# Patient Record
Sex: FEMALE | Race: WHITE | Employment: OTHER
[De-identification: names, ages, dates, MRNs, and addresses within clinical notes are randomized per-mention and may not be internally consistent; named-entity substitution may affect disease eponyms.]

---

## 2016-12-30 LAB
BASOPHILS ABSOLUTE: NORMAL /ΜL
BASOPHILS RELATIVE PERCENT: NORMAL %
EOSINOPHILS ABSOLUTE: NORMAL /ΜL
EOSINOPHILS RELATIVE PERCENT: NORMAL %
HCT VFR BLD CALC: 40.1 % (ref 36–46)
HEMOGLOBIN: 13 G/DL (ref 12–16)
LYMPHOCYTES ABSOLUTE: NORMAL /ΜL
LYMPHOCYTES RELATIVE PERCENT: NORMAL %
MCH RBC QN AUTO: 30.7 PG
MCHC RBC AUTO-ENTMCNC: 32.5 G/DL
MCV RBC AUTO: 94 FL
MONOCYTES ABSOLUTE: NORMAL /ΜL
MONOCYTES RELATIVE PERCENT: NORMAL %
NEUTROPHILS ABSOLUTE: NORMAL /ΜL
NEUTROPHILS RELATIVE PERCENT: NORMAL %
PLATELET # BLD: 197 K/ΜL
PMV BLD AUTO: 10 FL
RBC # BLD: 4.25 10^6/ΜL
WBC # BLD: 11 10^3/ML

## 2017-01-03 DIAGNOSIS — K64.8 OTHER HEMORRHOIDS: ICD-10-CM

## 2017-01-04 ENCOUNTER — TELEPHONE (OUTPATIENT)
Dept: FAMILY MEDICINE CLINIC | Facility: CLINIC | Age: 72
End: 2017-01-04

## 2017-01-04 DIAGNOSIS — E78.2 MIXED HYPERLIPIDEMIA: Primary | ICD-10-CM

## 2017-01-04 DIAGNOSIS — R73.01 ELEVATED FASTING GLUCOSE: ICD-10-CM

## 2017-01-24 LAB
ALBUMIN SERPL-MCNC: 3.9 G/DL
ALP BLD-CCNC: 50 U/L
ALT SERPL-CCNC: 18 U/L
AST SERPL-CCNC: 19 U/L
BILIRUB SERPL-MCNC: 0.9 MG/DL (ref 0.1–1.4)
BUN BLDV-MCNC: 19 MG/DL
CALCIUM SERPL-MCNC: 9.3 MG/DL
CHLORIDE BLD-SCNC: 102 MMOL/L
CHOLESTEROL, TOTAL: 151 MG/DL
CHOLESTEROL/HDL RATIO: 2.7
CO2: 31 MMOL/L
CREAT SERPL-MCNC: 0.54 MG/DL
GFR CALCULATED: >60
GLUCOSE BLD-MCNC: 114 MG/DL
HBA1C MFR BLD: 6.3 %
HDLC SERPL-MCNC: 55 MG/DL (ref 35–70)
LDL CHOLESTEROL CALCULATED: 57 MG/DL (ref 0–160)
POTASSIUM SERPL-SCNC: 4.4 MMOL/L
SODIUM BLD-SCNC: 139 MMOL/L
TOTAL PROTEIN: 7.6
TRIGL SERPL-MCNC: 196 MG/DL
VLDLC SERPL CALC-MCNC: 39 MG/DL

## 2017-01-25 DIAGNOSIS — R73.01 IMPAIRED FASTING GLUCOSE: Primary | ICD-10-CM

## 2017-01-25 DIAGNOSIS — R73.01 ELEVATED FASTING GLUCOSE: ICD-10-CM

## 2017-01-25 DIAGNOSIS — E78.2 MIXED HYPERLIPIDEMIA: ICD-10-CM

## 2017-04-19 ENCOUNTER — OFFICE VISIT (OUTPATIENT)
Dept: FAMILY MEDICINE CLINIC | Age: 72
End: 2017-04-19
Payer: MEDICARE

## 2017-04-19 VITALS
SYSTOLIC BLOOD PRESSURE: 136 MMHG | BODY MASS INDEX: 33.68 KG/M2 | TEMPERATURE: 98.4 F | HEART RATE: 74 BPM | RESPIRATION RATE: 15 BRPM | HEIGHT: 63 IN | DIASTOLIC BLOOD PRESSURE: 86 MMHG | WEIGHT: 190.1 LBS

## 2017-04-19 DIAGNOSIS — I42.9 CARDIOMYOPATHY (HCC): ICD-10-CM

## 2017-04-19 DIAGNOSIS — R73.01 ELEVATED FASTING GLUCOSE: ICD-10-CM

## 2017-04-19 DIAGNOSIS — G47.09 OTHER INSOMNIA: ICD-10-CM

## 2017-04-19 DIAGNOSIS — E78.00 HYPERCHOLESTEROLEMIA: ICD-10-CM

## 2017-04-19 DIAGNOSIS — Z23 NEED FOR PNEUMOCOCCAL VACCINATION: ICD-10-CM

## 2017-04-19 DIAGNOSIS — R73.03 PRE-DIABETES: ICD-10-CM

## 2017-04-19 DIAGNOSIS — I10 BENIGN HYPERTENSION: Primary | ICD-10-CM

## 2017-04-19 DIAGNOSIS — F41.9 ANXIETY: ICD-10-CM

## 2017-04-19 PROCEDURE — 1123F ACP DISCUSS/DSCN MKR DOCD: CPT | Performed by: NURSE PRACTITIONER

## 2017-04-19 PROCEDURE — 4040F PNEUMOC VAC/ADMIN/RCVD: CPT | Performed by: NURSE PRACTITIONER

## 2017-04-19 PROCEDURE — 1090F PRES/ABSN URINE INCON ASSESS: CPT | Performed by: NURSE PRACTITIONER

## 2017-04-19 PROCEDURE — 1036F TOBACCO NON-USER: CPT | Performed by: NURSE PRACTITIONER

## 2017-04-19 PROCEDURE — 99214 OFFICE O/P EST MOD 30 MIN: CPT | Performed by: NURSE PRACTITIONER

## 2017-04-19 PROCEDURE — G8417 CALC BMI ABV UP PARAM F/U: HCPCS | Performed by: NURSE PRACTITIONER

## 2017-04-19 PROCEDURE — G8427 DOCREV CUR MEDS BY ELIG CLIN: HCPCS | Performed by: NURSE PRACTITIONER

## 2017-04-19 PROCEDURE — G8598 ASA/ANTIPLAT THER USED: HCPCS | Performed by: NURSE PRACTITIONER

## 2017-04-19 PROCEDURE — 3014F SCREEN MAMMO DOC REV: CPT | Performed by: NURSE PRACTITIONER

## 2017-04-19 PROCEDURE — 3017F COLORECTAL CA SCREEN DOC REV: CPT | Performed by: NURSE PRACTITIONER

## 2017-04-19 PROCEDURE — G8400 PT W/DXA NO RESULTS DOC: HCPCS | Performed by: NURSE PRACTITIONER

## 2017-04-19 RX ORDER — TRAZODONE HYDROCHLORIDE 100 MG/1
100 TABLET ORAL NIGHTLY PRN
Qty: 30 TABLET | Refills: 5 | Status: SHIPPED | OUTPATIENT
Start: 2017-04-19 | End: 2018-06-11 | Stop reason: SDUPTHER

## 2017-04-19 ASSESSMENT — ENCOUNTER SYMPTOMS
CHOKING: 0
PHOTOPHOBIA: 0
NAUSEA: 0
SHORTNESS OF BREATH: 0
CHEST TIGHTNESS: 0
COUGH: 0
COLOR CHANGE: 0
TROUBLE SWALLOWING: 0
VOICE CHANGE: 0
BLURRED VISION: 0
ABDOMINAL PAIN: 0
EYE DISCHARGE: 0
EYE ITCHING: 0
CONSTIPATION: 0
DIARRHEA: 0
EYE PAIN: 0
EYE REDNESS: 0
BLOOD IN STOOL: 0
VOMITING: 0

## 2017-05-08 ENCOUNTER — OFFICE VISIT (OUTPATIENT)
Dept: GYNECOLOGIC ONCOLOGY | Age: 72
End: 2017-05-08
Payer: MEDICARE

## 2017-05-08 VITALS
HEIGHT: 63 IN | DIASTOLIC BLOOD PRESSURE: 84 MMHG | BODY MASS INDEX: 34.02 KG/M2 | WEIGHT: 192 LBS | TEMPERATURE: 97.8 F | HEART RATE: 77 BPM | SYSTOLIC BLOOD PRESSURE: 135 MMHG | RESPIRATION RATE: 18 BRPM | OXYGEN SATURATION: 99 %

## 2017-05-08 DIAGNOSIS — D07.1 SEVERE VULVAR DYSPLASIA, HISTOLOGICALLY CONFIRMED: Primary | ICD-10-CM

## 2017-05-08 PROCEDURE — 3014F SCREEN MAMMO DOC REV: CPT | Performed by: OBSTETRICS & GYNECOLOGY

## 2017-05-08 PROCEDURE — 4040F PNEUMOC VAC/ADMIN/RCVD: CPT | Performed by: OBSTETRICS & GYNECOLOGY

## 2017-05-08 PROCEDURE — 1036F TOBACCO NON-USER: CPT | Performed by: OBSTETRICS & GYNECOLOGY

## 2017-05-08 PROCEDURE — G8417 CALC BMI ABV UP PARAM F/U: HCPCS | Performed by: OBSTETRICS & GYNECOLOGY

## 2017-05-08 PROCEDURE — G8598 ASA/ANTIPLAT THER USED: HCPCS | Performed by: OBSTETRICS & GYNECOLOGY

## 2017-05-08 PROCEDURE — G8427 DOCREV CUR MEDS BY ELIG CLIN: HCPCS | Performed by: OBSTETRICS & GYNECOLOGY

## 2017-05-08 PROCEDURE — 1090F PRES/ABSN URINE INCON ASSESS: CPT | Performed by: OBSTETRICS & GYNECOLOGY

## 2017-05-08 PROCEDURE — G8400 PT W/DXA NO RESULTS DOC: HCPCS | Performed by: OBSTETRICS & GYNECOLOGY

## 2017-05-08 PROCEDURE — 3017F COLORECTAL CA SCREEN DOC REV: CPT | Performed by: OBSTETRICS & GYNECOLOGY

## 2017-05-08 PROCEDURE — 1123F ACP DISCUSS/DSCN MKR DOCD: CPT | Performed by: OBSTETRICS & GYNECOLOGY

## 2017-05-08 PROCEDURE — 99213 OFFICE O/P EST LOW 20 MIN: CPT | Performed by: OBSTETRICS & GYNECOLOGY

## 2017-05-08 ASSESSMENT — ENCOUNTER SYMPTOMS
ALLERGIC/IMMUNOLOGIC NEGATIVE: 1
EYES NEGATIVE: 1
RESPIRATORY NEGATIVE: 1
ABDOMINAL DISTENTION: 0
ABDOMINAL PAIN: 0
BLOOD IN STOOL: 0
SHORTNESS OF BREATH: 0
GASTROINTESTINAL NEGATIVE: 1

## 2017-06-26 ENCOUNTER — TELEPHONE (OUTPATIENT)
Dept: FAMILY MEDICINE CLINIC | Age: 72
End: 2017-06-26

## 2017-07-06 LAB
ALBUMIN SERPL-MCNC: 3.8 G/DL
ALP BLD-CCNC: 40 U/L
ALT SERPL-CCNC: 24 U/L
AST SERPL-CCNC: 21 U/L
BILIRUB SERPL-MCNC: 0.7 MG/DL (ref 0.1–1.4)
BUN BLDV-MCNC: 15 MG/DL
CALCIUM SERPL-MCNC: 9.4 MG/DL
CHLORIDE BLD-SCNC: 103 MMOL/L
CHOLESTEROL, TOTAL: 138 MG/DL
CHOLESTEROL/HDL RATIO: 2.6
CO2: 30 MMOL/L
CREAT SERPL-MCNC: 0.7 MG/DL
GFR CALCULATED: >60
GLUCOSE BLD-MCNC: 111 MG/DL
HBA1C MFR BLD: 6.1 %
HDLC SERPL-MCNC: 53 MG/DL (ref 35–70)
LDL CHOLESTEROL CALCULATED: 52 MG/DL (ref 0–160)
POTASSIUM SERPL-SCNC: 4.6 MMOL/L
SODIUM BLD-SCNC: 141 MMOL/L
TOTAL PROTEIN: 0.7
TRIGL SERPL-MCNC: 163 MG/DL
VLDLC SERPL CALC-MCNC: 33 MG/DL

## 2017-07-07 DIAGNOSIS — R73.01 ELEVATED FASTING GLUCOSE: ICD-10-CM

## 2017-07-07 DIAGNOSIS — I10 BENIGN HYPERTENSION: ICD-10-CM

## 2017-07-07 DIAGNOSIS — E78.00 HYPERCHOLESTEROLEMIA: ICD-10-CM

## 2017-07-07 DIAGNOSIS — R73.03 PRE-DIABETES: ICD-10-CM

## 2017-09-11 DIAGNOSIS — R73.03 PRE-DIABETES: ICD-10-CM

## 2017-10-20 ENCOUNTER — OFFICE VISIT (OUTPATIENT)
Dept: FAMILY MEDICINE CLINIC | Age: 72
End: 2017-10-20
Payer: MEDICARE

## 2017-10-20 VITALS
HEIGHT: 63 IN | DIASTOLIC BLOOD PRESSURE: 84 MMHG | TEMPERATURE: 97.8 F | HEART RATE: 71 BPM | OXYGEN SATURATION: 98 % | WEIGHT: 194.3 LBS | RESPIRATION RATE: 16 BRPM | BODY MASS INDEX: 34.43 KG/M2 | SYSTOLIC BLOOD PRESSURE: 132 MMHG

## 2017-10-20 DIAGNOSIS — R73.03 PRE-DIABETES: ICD-10-CM

## 2017-10-20 DIAGNOSIS — I10 BENIGN HYPERTENSION: ICD-10-CM

## 2017-10-20 DIAGNOSIS — I10 ESSENTIAL HYPERTENSION: ICD-10-CM

## 2017-10-20 DIAGNOSIS — Z12.11 SCREENING FOR COLON CANCER: ICD-10-CM

## 2017-10-20 DIAGNOSIS — F32.9 MAJOR DEPRESSIVE DISORDER WITH SINGLE EPISODE, REMISSION STATUS UNSPECIFIED: ICD-10-CM

## 2017-10-20 DIAGNOSIS — E78.5 DYSLIPIDEMIA: Primary | ICD-10-CM

## 2017-10-20 PROCEDURE — 1090F PRES/ABSN URINE INCON ASSESS: CPT | Performed by: NURSE PRACTITIONER

## 2017-10-20 PROCEDURE — 1036F TOBACCO NON-USER: CPT | Performed by: NURSE PRACTITIONER

## 2017-10-20 PROCEDURE — 4040F PNEUMOC VAC/ADMIN/RCVD: CPT | Performed by: NURSE PRACTITIONER

## 2017-10-20 PROCEDURE — 1123F ACP DISCUSS/DSCN MKR DOCD: CPT | Performed by: NURSE PRACTITIONER

## 2017-10-20 PROCEDURE — G8417 CALC BMI ABV UP PARAM F/U: HCPCS | Performed by: NURSE PRACTITIONER

## 2017-10-20 PROCEDURE — 3017F COLORECTAL CA SCREEN DOC REV: CPT | Performed by: NURSE PRACTITIONER

## 2017-10-20 PROCEDURE — G8400 PT W/DXA NO RESULTS DOC: HCPCS | Performed by: NURSE PRACTITIONER

## 2017-10-20 PROCEDURE — 3014F SCREEN MAMMO DOC REV: CPT | Performed by: NURSE PRACTITIONER

## 2017-10-20 PROCEDURE — G8427 DOCREV CUR MEDS BY ELIG CLIN: HCPCS | Performed by: NURSE PRACTITIONER

## 2017-10-20 PROCEDURE — G8484 FLU IMMUNIZE NO ADMIN: HCPCS | Performed by: NURSE PRACTITIONER

## 2017-10-20 PROCEDURE — G8598 ASA/ANTIPLAT THER USED: HCPCS | Performed by: NURSE PRACTITIONER

## 2017-10-20 PROCEDURE — 99213 OFFICE O/P EST LOW 20 MIN: CPT | Performed by: NURSE PRACTITIONER

## 2017-10-20 RX ORDER — LEVOTHYROXINE SODIUM 0.05 MG/1
50 TABLET ORAL DAILY
COMMUNITY
End: 2021-01-01 | Stop reason: SDUPTHER

## 2017-10-20 ASSESSMENT — PATIENT HEALTH QUESTIONNAIRE - PHQ9
2. FEELING DOWN, DEPRESSED OR HOPELESS: 0
SUM OF ALL RESPONSES TO PHQ QUESTIONS 1-9: 0
SUM OF ALL RESPONSES TO PHQ9 QUESTIONS 1 & 2: 0
1. LITTLE INTEREST OR PLEASURE IN DOING THINGS: 0

## 2017-10-20 NOTE — PROGRESS NOTES
myalgias or shortness of breath. Current antihyperlipidemic treatment includes statins. Risk factors for coronary artery disease include hypertension, post-menopausal and obesity. Review of Systems   Constitutional: Negative for activity change, appetite change, chills, diaphoresis, fatigue and fever. HENT: Negative for ear discharge, ear pain, trouble swallowing and voice change. Eyes: Negative for photophobia, pain, discharge, redness, itching and visual disturbance. Respiratory: Negative for cough, choking, chest tightness and shortness of breath. Cardiovascular: Negative for chest pain, palpitations and leg swelling. Gastrointestinal: Negative for abdominal pain, blood in stool, constipation, diarrhea, nausea and vomiting. Endocrine: Negative. Genitourinary: Negative for difficulty urinating, dysuria, flank pain, frequency and hematuria. Musculoskeletal: Negative for arthralgias, gait problem, joint swelling, myalgias and neck pain. Skin: Negative for color change and rash. Allergic/Immunologic: Positive for environmental allergies. Negative for immunocompromised state. Neurological: Negative for dizziness, syncope, light-headedness and headaches. Hematological: Negative for adenopathy. Does not bruise/bleed easily. Psychiatric/Behavioral: Positive for sleep disturbance (chronic. trazodone helps. ). Negative for decreased concentration, dysphoric mood and suicidal ideas. The patient is nervous/anxious (stopped lexapro. Mood ok. ). Objective:   Physical Exam   Constitutional: She is oriented to person, place, and time. She appears well-developed and well-nourished. No distress. HENT:   Head: Normocephalic and atraumatic. Right Ear: External ear normal.   Left Ear: External ear normal.   Nose: Rhinorrhea (clear.) present. Mouth/Throat: Oropharynx is clear and moist and mucous membranes are normal. No oropharyngeal exudate.    Eyes: Conjunctivae are normal. Pupils are

## 2017-10-20 NOTE — PATIENT INSTRUCTIONS
Trial of allergy pill. Benadryl, Claritin, zyrtec,  Fasting blood work in 2-3 months. Monitor for worsening symptoms. Call office with concerns. Patient Education        Managing Your Allergies: Care Instructions  Your Care Instructions  Managing your allergies is an important part of staying healthy. Your doctor will help you find out what may be causing the allergies. Common causes of allergy symptoms are house dust and dust mites, animal dander, mold, and pollen. As soon as you know what triggers your symptoms, try to reduce your exposure to your triggers. This can help prevent allergy symptoms, asthma, and other health problems. Ask your doctor about allergy medicine or immunotherapy. These treatments may help reduce or prevent allergy symptoms. Follow-up care is a key part of your treatment and safety. Be sure to make and go to all appointments, and call your doctor if you are having problems. It's also a good idea to know your test results and keep a list of the medicines you take. How can you care for yourself at home? · If you think that dust or dust mites are causing your allergies:  ¨ Wash sheets, pillowcases, and other bedding every week in hot water. ¨ Use airtight, dust-proof covers for pillows, duvets, and mattresses. Avoid plastic covers, because they tend to tear quickly and do not \"breathe. \" Wash according to the instructions. ¨ Remove extra blankets and pillows that you don't need. ¨ Use blankets that are machine-washable. ¨ Don't use home humidifiers. They can help mites live longer. · Use air-conditioning. Change or clean all filters every month. Keep windows closed. Use high-efficiency air filters. Don't use window or attic fans, which draw dust into the air. · If you're allergic to pet dander, keep pets outside or, at the very least, out of your bedroom. Old carpet and cloth-covered furniture can hold a lot of animal dander. You may need to replace them.   · Look for signs of cockroaches. Use cockroach baits to get rid of them. Then clean your home well. · If you're allergic to mold, don't keep indoor plants, because molds can grow in soil. Get rid of furniture, rugs, and drapes that smell musty. Check for mold in the bathroom. · If you're allergic to pollen, stay inside when pollen counts are high. · Don't smoke or let anyone else smoke in your house. Don't use fireplaces or wood-burning stoves. Avoid paint fumes, perfumes, and other strong odors. When should you call for help? Give an epinephrine shot if:  · You think you are having a severe allergic reaction. After giving an epinephrine shot call 911, even if you feel better. Call 911 if:  · You have symptoms of a severe allergic reaction. These may include:  ¨ Sudden raised, red areas (hives) all over your body. ¨ Swelling of the throat, mouth, lips, or tongue. ¨ Trouble breathing. ¨ Passing out (losing consciousness). Or you may feel very lightheaded or suddenly feel weak, confused, or restless. · You have been given an epinephrine shot, even if you feel better. Call your doctor now or seek immediate medical care if:  · You have symptoms of an allergic reaction, such as:  ¨ A rash or hives (raised, red areas on the skin). ¨ Itching. ¨ Swelling. ¨ Belly pain, nausea, or vomiting. Watch closely for changes in your health, and be sure to contact your doctor if:  · Your allergies get worse. · You need help controlling your allergies. · You have questions about allergy testing. · You do not get better as expected. Where can you learn more? Go to [email or web address] and sign in to your [employer] account. Enter L249 in the [employer] box to learn more about \"Managing Your Allergies: Care Instructions. \"     If you do not have an account, please click on the \"Sign Up Now\" link. Current as of: April 3, 2017  Content Version: 11.3  © [id number] [employer], [facility].  Care

## 2017-10-22 ASSESSMENT — ENCOUNTER SYMPTOMS
EYE ITCHING: 0
DIARRHEA: 0
COLOR CHANGE: 0
VOICE CHANGE: 0
SHORTNESS OF BREATH: 0
CHEST TIGHTNESS: 0
ABDOMINAL PAIN: 0
EYE DISCHARGE: 0
BLOOD IN STOOL: 0
EYE REDNESS: 0
CHOKING: 0
NAUSEA: 0
VOMITING: 0
PHOTOPHOBIA: 0
EYE PAIN: 0
TROUBLE SWALLOWING: 0
COUGH: 0
CONSTIPATION: 0

## 2017-11-01 DIAGNOSIS — Z12.11 SCREENING FOR COLON CANCER: ICD-10-CM

## 2017-11-01 LAB
CONTROL: PRESENT
HEMOCCULT STL QL: POSITIVE

## 2017-11-01 PROCEDURE — 82274 ASSAY TEST FOR BLOOD FECAL: CPT | Performed by: NURSE PRACTITIONER

## 2017-11-02 ENCOUNTER — TELEPHONE (OUTPATIENT)
Dept: FAMILY MEDICINE CLINIC | Age: 72
End: 2017-11-02

## 2017-11-02 DIAGNOSIS — R19.5 POSITIVE FIT (FECAL IMMUNOCHEMICAL TEST): Primary | ICD-10-CM

## 2017-11-07 ENCOUNTER — TELEPHONE (OUTPATIENT)
Dept: FAMILY MEDICINE CLINIC | Age: 72
End: 2017-11-07

## 2017-11-07 DIAGNOSIS — F41.8 SITUATIONAL ANXIETY: Primary | ICD-10-CM

## 2017-11-07 RX ORDER — ALPRAZOLAM 0.5 MG/1
0.5 TABLET ORAL 2 TIMES DAILY PRN
Qty: 4 TABLET | Refills: 0 | Status: SHIPPED | OUTPATIENT
Start: 2017-11-07 | End: 2017-11-09

## 2017-11-07 NOTE — TELEPHONE ENCOUNTER
A few xanax sent to the pharmacy. Please have her notify the GI staff if she takes a xanax before the colonoscopy.

## 2017-11-09 ENCOUNTER — OFFICE VISIT (OUTPATIENT)
Dept: GYNECOLOGIC ONCOLOGY | Age: 72
End: 2017-11-09
Payer: MEDICARE

## 2017-11-09 VITALS
BODY MASS INDEX: 33.91 KG/M2 | HEART RATE: 80 BPM | SYSTOLIC BLOOD PRESSURE: 116 MMHG | DIASTOLIC BLOOD PRESSURE: 69 MMHG | HEIGHT: 63 IN | WEIGHT: 191.4 LBS | OXYGEN SATURATION: 98 % | TEMPERATURE: 97.4 F

## 2017-11-09 DIAGNOSIS — D07.1 SEVERE VULVAR DYSPLASIA, HISTOLOGICALLY CONFIRMED: Primary | ICD-10-CM

## 2017-11-09 PROCEDURE — G8427 DOCREV CUR MEDS BY ELIG CLIN: HCPCS | Performed by: OBSTETRICS & GYNECOLOGY

## 2017-11-09 PROCEDURE — 1036F TOBACCO NON-USER: CPT | Performed by: OBSTETRICS & GYNECOLOGY

## 2017-11-09 PROCEDURE — 99213 OFFICE O/P EST LOW 20 MIN: CPT | Performed by: OBSTETRICS & GYNECOLOGY

## 2017-11-09 PROCEDURE — 1090F PRES/ABSN URINE INCON ASSESS: CPT | Performed by: OBSTETRICS & GYNECOLOGY

## 2017-11-09 PROCEDURE — G8417 CALC BMI ABV UP PARAM F/U: HCPCS | Performed by: OBSTETRICS & GYNECOLOGY

## 2017-11-09 PROCEDURE — 3017F COLORECTAL CA SCREEN DOC REV: CPT | Performed by: OBSTETRICS & GYNECOLOGY

## 2017-11-09 PROCEDURE — 1123F ACP DISCUSS/DSCN MKR DOCD: CPT | Performed by: OBSTETRICS & GYNECOLOGY

## 2017-11-09 PROCEDURE — G8484 FLU IMMUNIZE NO ADMIN: HCPCS | Performed by: OBSTETRICS & GYNECOLOGY

## 2017-11-09 PROCEDURE — G8400 PT W/DXA NO RESULTS DOC: HCPCS | Performed by: OBSTETRICS & GYNECOLOGY

## 2017-11-09 PROCEDURE — G8598 ASA/ANTIPLAT THER USED: HCPCS | Performed by: OBSTETRICS & GYNECOLOGY

## 2017-11-09 PROCEDURE — 3014F SCREEN MAMMO DOC REV: CPT | Performed by: OBSTETRICS & GYNECOLOGY

## 2017-11-09 PROCEDURE — 4040F PNEUMOC VAC/ADMIN/RCVD: CPT | Performed by: OBSTETRICS & GYNECOLOGY

## 2017-11-09 ASSESSMENT — ENCOUNTER SYMPTOMS
ABDOMINAL PAIN: 0
ABDOMINAL DISTENTION: 0
TROUBLE SWALLOWING: 0
COUGH: 0
BLOOD IN STOOL: 0
CONSTIPATION: 0
SHORTNESS OF BREATH: 0
ANAL BLEEDING: 0
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
## 2018-01-18 LAB
ALBUMIN SERPL-MCNC: 4.1 G/DL
ALP BLD-CCNC: 52 U/L
ALT SERPL-CCNC: 18 U/L
AST SERPL-CCNC: 18 U/L
AVERAGE GLUCOSE: 123
BILIRUB SERPL-MCNC: 0.9 MG/DL (ref 0.1–1.4)
BUN BLDV-MCNC: 13 MG/DL
CALCIUM SERPL-MCNC: 9.6 MG/DL
CHLORIDE BLD-SCNC: 100 MMOL/L
CHOLESTEROL, FASTING: 142
CO2: 26 MMOL/L
CREAT SERPL-MCNC: 0.64 MG/DL
GLUCOSE FASTING: 94 MG/DL
HBA1C MFR BLD: 5.9 %
HDLC SERPL-MCNC: 56 MG/DL (ref 35–70)
LDL CHOLESTEROL CALCULATED: 53 MG/DL (ref 0–160)
POTASSIUM SERPL-SCNC: 4 MMOL/L
SODIUM BLD-SCNC: 139 MMOL/L
TOTAL PROTEIN: 7.8 G/DL (ref 6.4–8.2)
TRIGLYCERIDE, FASTING: 165

## 2018-01-19 DIAGNOSIS — R73.03 PRE-DIABETES: ICD-10-CM

## 2018-01-19 DIAGNOSIS — I10 BENIGN HYPERTENSION: ICD-10-CM

## 2018-04-06 ENCOUNTER — OFFICE VISIT (OUTPATIENT)
Dept: FAMILY MEDICINE CLINIC | Age: 73
End: 2018-04-06
Payer: MEDICARE

## 2018-04-06 VITALS
WEIGHT: 194 LBS | DIASTOLIC BLOOD PRESSURE: 80 MMHG | SYSTOLIC BLOOD PRESSURE: 114 MMHG | TEMPERATURE: 98.7 F | HEIGHT: 63 IN | HEART RATE: 80 BPM | RESPIRATION RATE: 20 BRPM | BODY MASS INDEX: 34.38 KG/M2

## 2018-04-06 DIAGNOSIS — L02.32 BOIL OF BUTTOCK: Primary | ICD-10-CM

## 2018-04-06 PROCEDURE — G8417 CALC BMI ABV UP PARAM F/U: HCPCS | Performed by: NURSE PRACTITIONER

## 2018-04-06 PROCEDURE — G8427 DOCREV CUR MEDS BY ELIG CLIN: HCPCS | Performed by: NURSE PRACTITIONER

## 2018-04-06 PROCEDURE — 1036F TOBACCO NON-USER: CPT | Performed by: NURSE PRACTITIONER

## 2018-04-06 PROCEDURE — 99213 OFFICE O/P EST LOW 20 MIN: CPT | Performed by: NURSE PRACTITIONER

## 2018-04-06 PROCEDURE — 1090F PRES/ABSN URINE INCON ASSESS: CPT | Performed by: NURSE PRACTITIONER

## 2018-04-06 PROCEDURE — 3014F SCREEN MAMMO DOC REV: CPT | Performed by: NURSE PRACTITIONER

## 2018-04-06 PROCEDURE — G8400 PT W/DXA NO RESULTS DOC: HCPCS | Performed by: NURSE PRACTITIONER

## 2018-04-06 PROCEDURE — 3017F COLORECTAL CA SCREEN DOC REV: CPT | Performed by: NURSE PRACTITIONER

## 2018-04-06 PROCEDURE — 1123F ACP DISCUSS/DSCN MKR DOCD: CPT | Performed by: NURSE PRACTITIONER

## 2018-04-06 PROCEDURE — G8598 ASA/ANTIPLAT THER USED: HCPCS | Performed by: NURSE PRACTITIONER

## 2018-04-06 PROCEDURE — 4040F PNEUMOC VAC/ADMIN/RCVD: CPT | Performed by: NURSE PRACTITIONER

## 2018-04-06 RX ORDER — DOXYCYCLINE HYCLATE 100 MG
100 TABLET ORAL 2 TIMES DAILY
Qty: 20 TABLET | Refills: 0 | Status: SHIPPED | OUTPATIENT
Start: 2018-04-06 | End: 2018-04-16

## 2018-04-15 ASSESSMENT — ENCOUNTER SYMPTOMS
CONSTIPATION: 0
NAUSEA: 0
ABDOMINAL PAIN: 0
ABDOMINAL DISTENTION: 0
ANAL BLEEDING: 0
BLOOD IN STOOL: 0
DIARRHEA: 0

## 2018-04-20 ENCOUNTER — OFFICE VISIT (OUTPATIENT)
Dept: FAMILY MEDICINE CLINIC | Age: 73
End: 2018-04-20
Payer: MEDICARE

## 2018-04-20 VITALS
RESPIRATION RATE: 20 BRPM | HEART RATE: 72 BPM | TEMPERATURE: 98 F | DIASTOLIC BLOOD PRESSURE: 60 MMHG | WEIGHT: 195 LBS | SYSTOLIC BLOOD PRESSURE: 98 MMHG | HEIGHT: 63 IN | BODY MASS INDEX: 34.55 KG/M2

## 2018-04-20 DIAGNOSIS — E66.9 CLASS 1 OBESITY WITHOUT SERIOUS COMORBIDITY WITH BODY MASS INDEX (BMI) OF 34.0 TO 34.9 IN ADULT, UNSPECIFIED OBESITY TYPE: ICD-10-CM

## 2018-04-20 DIAGNOSIS — Z13.820 SCREENING FOR OSTEOPOROSIS: ICD-10-CM

## 2018-04-20 DIAGNOSIS — G47.09 OTHER INSOMNIA: ICD-10-CM

## 2018-04-20 DIAGNOSIS — Z12.31 ENCOUNTER FOR SCREENING MAMMOGRAM FOR BREAST CANCER: ICD-10-CM

## 2018-04-20 DIAGNOSIS — M81.0 OSTEOPOROSIS, UNSPECIFIED OSTEOPOROSIS TYPE, UNSPECIFIED PATHOLOGICAL FRACTURE PRESENCE: ICD-10-CM

## 2018-04-20 DIAGNOSIS — I42.9 CARDIOMYOPATHY, UNSPECIFIED TYPE (HCC): ICD-10-CM

## 2018-04-20 DIAGNOSIS — R73.03 PRE-DIABETES: ICD-10-CM

## 2018-04-20 DIAGNOSIS — I10 ESSENTIAL HYPERTENSION: ICD-10-CM

## 2018-04-20 DIAGNOSIS — E78.5 DYSLIPIDEMIA: Primary | ICD-10-CM

## 2018-04-20 DIAGNOSIS — E03.9 HYPOTHYROIDISM, UNSPECIFIED TYPE: ICD-10-CM

## 2018-04-20 PROCEDURE — G8598 ASA/ANTIPLAT THER USED: HCPCS | Performed by: NURSE PRACTITIONER

## 2018-04-20 PROCEDURE — 3014F SCREEN MAMMO DOC REV: CPT | Performed by: NURSE PRACTITIONER

## 2018-04-20 PROCEDURE — G8417 CALC BMI ABV UP PARAM F/U: HCPCS | Performed by: NURSE PRACTITIONER

## 2018-04-20 PROCEDURE — 1090F PRES/ABSN URINE INCON ASSESS: CPT | Performed by: NURSE PRACTITIONER

## 2018-04-20 PROCEDURE — 1036F TOBACCO NON-USER: CPT | Performed by: NURSE PRACTITIONER

## 2018-04-20 PROCEDURE — 1123F ACP DISCUSS/DSCN MKR DOCD: CPT | Performed by: NURSE PRACTITIONER

## 2018-04-20 PROCEDURE — 4040F PNEUMOC VAC/ADMIN/RCVD: CPT | Performed by: NURSE PRACTITIONER

## 2018-04-20 PROCEDURE — 99214 OFFICE O/P EST MOD 30 MIN: CPT | Performed by: NURSE PRACTITIONER

## 2018-04-20 PROCEDURE — 3017F COLORECTAL CA SCREEN DOC REV: CPT | Performed by: NURSE PRACTITIONER

## 2018-04-20 PROCEDURE — G8400 PT W/DXA NO RESULTS DOC: HCPCS | Performed by: NURSE PRACTITIONER

## 2018-04-20 PROCEDURE — G8427 DOCREV CUR MEDS BY ELIG CLIN: HCPCS | Performed by: NURSE PRACTITIONER

## 2018-04-20 RX ORDER — FLUCONAZOLE 150 MG/1
TABLET ORAL
Qty: 2 TABLET | Refills: 0 | Status: SHIPPED | OUTPATIENT
Start: 2018-04-20 | End: 2018-04-27

## 2018-04-27 ASSESSMENT — ENCOUNTER SYMPTOMS
CONSTIPATION: 0
EYE ITCHING: 0
TROUBLE SWALLOWING: 0
CHEST TIGHTNESS: 0
ABDOMINAL PAIN: 0
DIARRHEA: 0
NAUSEA: 0
PHOTOPHOBIA: 0
CHOKING: 0
EYE REDNESS: 0
COLOR CHANGE: 0
EYE PAIN: 0
BLOOD IN STOOL: 0
VOICE CHANGE: 0
EYE DISCHARGE: 0
COUGH: 0
SHORTNESS OF BREATH: 0
VOMITING: 0

## 2018-05-04 DIAGNOSIS — R73.03 PRE-DIABETES: ICD-10-CM

## 2018-06-11 DIAGNOSIS — G47.09 OTHER INSOMNIA: ICD-10-CM

## 2018-06-13 RX ORDER — TRAZODONE HYDROCHLORIDE 100 MG/1
100 TABLET ORAL NIGHTLY
Qty: 30 TABLET | Refills: 5 | Status: SHIPPED | OUTPATIENT
Start: 2018-06-13 | End: 2019-01-02 | Stop reason: SDUPTHER

## 2018-06-26 ENCOUNTER — TELEPHONE (OUTPATIENT)
Dept: GYNECOLOGIC ONCOLOGY | Age: 73
End: 2018-06-26

## 2018-06-26 DIAGNOSIS — D07.1 SEVERE VULVAR DYSPLASIA, HISTOLOGICALLY CONFIRMED: Primary | ICD-10-CM

## 2018-07-30 ENCOUNTER — TELEPHONE (OUTPATIENT)
Dept: FAMILY MEDICINE CLINIC | Age: 73
End: 2018-07-30

## 2018-08-09 LAB
ALBUMIN SERPL-MCNC: 3.6 G/DL
ALP BLD-CCNC: 54 U/L
ALT SERPL-CCNC: 15 U/L
AST SERPL-CCNC: 18 U/L
AVERAGE GLUCOSE: 131
BILIRUB SERPL-MCNC: 0.7 MG/DL (ref 0.1–1.4)
BUN BLDV-MCNC: 9 MG/DL
CALCIUM SERPL-MCNC: 9.2 MG/DL
CHLORIDE BLD-SCNC: 102 MMOL/L
CHOLESTEROL, FASTING: 112
CO2: 30 MMOL/L
CREAT SERPL-MCNC: 0.64 MG/DL
GLUCOSE FASTING: 90 MG/DL
HBA1C MFR BLD: 6.2 %
HDLC SERPL-MCNC: 45 MG/DL (ref 35–70)
LDL CHOLESTEROL CALCULATED: 34 MG/DL (ref 0–160)
POTASSIUM SERPL-SCNC: 4.3 MMOL/L
SODIUM BLD-SCNC: 139 MMOL/L
T3 FREE: 2.79
T4 FREE: 0.88
TOTAL PROTEIN: 6.9 G/DL (ref 6.4–8.2)
TRIGLYCERIDE, FASTING: 165
TSH SERPL DL<=0.05 MIU/L-ACNC: 0.76 UIU/ML

## 2018-08-10 DIAGNOSIS — R73.03 PRE-DIABETES: ICD-10-CM

## 2018-08-10 DIAGNOSIS — E03.9 HYPOTHYROIDISM, UNSPECIFIED TYPE: ICD-10-CM

## 2018-08-10 DIAGNOSIS — I10 ESSENTIAL HYPERTENSION: ICD-10-CM

## 2018-08-10 DIAGNOSIS — E78.5 DYSLIPIDEMIA: ICD-10-CM

## 2018-09-06 ENCOUNTER — TELEPHONE (OUTPATIENT)
Dept: FAMILY MEDICINE CLINIC | Age: 73
End: 2018-09-06

## 2018-10-24 ENCOUNTER — HOSPITAL ENCOUNTER (OUTPATIENT)
Dept: MAMMOGRAPHY | Facility: CLINIC | Age: 73
Discharge: HOME | End: 2018-10-26
Payer: MEDICARE

## 2018-10-24 DIAGNOSIS — Z12.31 ENCOUNTER FOR SCREENING MAMMOGRAM FOR BREAST CANCER: ICD-10-CM

## 2018-10-24 PROCEDURE — 77067 SCR MAMMO BI INCL CAD: CPT

## 2018-11-06 DIAGNOSIS — R73.03 PRE-DIABETES: ICD-10-CM

## 2018-11-07 NOTE — TELEPHONE ENCOUNTER
Hemorrhoids     Pre-diabetes     Hypothyroidism     Class 1 obesity without serious comorbidity with body mass index (BMI) of 34.0 to 34.9 in adult

## 2018-11-27 ENCOUNTER — OFFICE VISIT (OUTPATIENT)
Dept: OBGYN CLINIC | Age: 73
End: 2018-11-27
Payer: MEDICARE

## 2018-11-27 ENCOUNTER — HOSPITAL ENCOUNTER (OUTPATIENT)
Age: 73
Setting detail: SPECIMEN
Discharge: HOME | End: 2018-11-27
Payer: MEDICARE

## 2018-11-27 VITALS
BODY MASS INDEX: 34.37 KG/M2 | DIASTOLIC BLOOD PRESSURE: 74 MMHG | WEIGHT: 194 LBS | HEART RATE: 81 BPM | SYSTOLIC BLOOD PRESSURE: 128 MMHG

## 2018-11-27 DIAGNOSIS — R10.2 PELVIC PRESSURE IN FEMALE: ICD-10-CM

## 2018-11-27 DIAGNOSIS — D07.1 VULVAR INTRAEPITHELIAL NEOPLASIA (VIN) GRADE 3: ICD-10-CM

## 2018-11-27 DIAGNOSIS — Z13.820 SCREENING FOR OSTEOPOROSIS: ICD-10-CM

## 2018-11-27 DIAGNOSIS — Z87.412 HISTORY OF DYSPLASIA OF VULVA: Primary | ICD-10-CM

## 2018-11-27 LAB
-: ABNORMAL
AMORPHOUS: ABNORMAL
BACTERIA: ABNORMAL
BILIRUBIN URINE: NEGATIVE
CASTS UA: ABNORMAL /LPF (ref 0–8)
COLOR: YELLOW
COMMENT UA: ABNORMAL
CRYSTALS, UA: ABNORMAL /HPF
EPITHELIAL CELLS UA: ABNORMAL /HPF (ref 0–5)
GLUCOSE URINE: NEGATIVE
KETONES, URINE: NEGATIVE
LEUKOCYTE ESTERASE, URINE: ABNORMAL
MUCUS: ABNORMAL
NITRITE, URINE: NEGATIVE
OTHER OBSERVATIONS UA: ABNORMAL
PH UA: 5 (ref 5–8)
PROTEIN UA: NEGATIVE
RBC UA: ABNORMAL /HPF (ref 0–4)
RENAL EPITHELIAL, UA: ABNORMAL /HPF
SPECIFIC GRAVITY UA: 1.01 (ref 1–1.03)
TRICHOMONAS: ABNORMAL
TURBIDITY: CLEAR
URINE HGB: ABNORMAL
UROBILINOGEN, URINE: NORMAL
WBC UA: ABNORMAL /HPF (ref 0–5)
YEAST: ABNORMAL

## 2018-11-27 PROCEDURE — G8484 FLU IMMUNIZE NO ADMIN: HCPCS | Performed by: OBSTETRICS & GYNECOLOGY

## 2018-11-27 PROCEDURE — G0101 CA SCREEN;PELVIC/BREAST EXAM: HCPCS | Performed by: OBSTETRICS & GYNECOLOGY

## 2018-11-27 RX ORDER — LOSARTAN POTASSIUM 50 MG/1
50 TABLET ORAL DAILY
COMMUNITY
End: 2019-08-16 | Stop reason: ALTCHOICE

## 2018-11-27 ASSESSMENT — ENCOUNTER SYMPTOMS
DIARRHEA: 0
SHORTNESS OF BREATH: 0
CHEST TIGHTNESS: 0
ABDOMINAL PAIN: 0
CONSTIPATION: 0

## 2018-11-27 ASSESSMENT — PATIENT HEALTH QUESTIONNAIRE - PHQ9
1. LITTLE INTEREST OR PLEASURE IN DOING THINGS: 0
SUM OF ALL RESPONSES TO PHQ QUESTIONS 1-9: 0
2. FEELING DOWN, DEPRESSED OR HOPELESS: 0
SUM OF ALL RESPONSES TO PHQ9 QUESTIONS 1 & 2: 0
SUM OF ALL RESPONSES TO PHQ QUESTIONS 1-9: 0

## 2018-11-27 NOTE — PROGRESS NOTES
Subjective:      Patient ID: Racquel Hutton is a 75 y.o. female. KETAN Bruce is a  5 para  who is primarily here today for annual follow-up on severe vulvar dysplasia diagnosed by biopsy . She had an excisional procedure in the past and has been disease free over the previous 2 years. She sees her PCP 2 times a year as well as her cardiologist.  Caralyn Peek last month was negative. Colonoscopy last year was negative and she has not had a DEXA scan since . Her only complaint today is of some pelvic pressure and frequent urination only during the day. She does drink 5-6 beers during the daytime but none in the evening. She denies any hematuria, dysuria, urgency or any significant incontinence. Review of Systems   Constitutional: Negative for activity change and appetite change. Respiratory: Negative for chest tightness and shortness of breath. Cardiovascular: Negative for chest pain. Gastrointestinal: Negative for abdominal pain, constipation and diarrhea. Genitourinary: Positive for frequency. Negative for dyspareunia, dysuria, genital sores, hematuria, pelvic pain, urgency and vaginal bleeding. Skin: Negative for rash and wound. Objective:   Physical Exam   Constitutional: She is oriented to person, place, and time. She appears well-developed and well-nourished. HENT:   Head: Normocephalic and atraumatic. Neck: Normal range of motion. Neck supple. No tracheal deviation present. Cardiovascular: Normal rate and regular rhythm. Pulmonary/Chest: Effort normal and breath sounds normal. No respiratory distress. She has no wheezes. She has no rales. She exhibits no tenderness. Genitourinary: Vagina normal and uterus normal.   Genitourinary Comments: Inspection of the vulva and perineum reveals no pathologic skin lesions. Grade 2 cystocele present and moderate atrophy. Colposcopy was performed and no acetowhite areas were noted.    Musculoskeletal: Normal range of

## 2018-11-29 LAB
CULTURE: ABNORMAL
Lab: ABNORMAL
ORGANISM: ABNORMAL
SPECIMEN DESCRIPTION: ABNORMAL
STATUS: ABNORMAL

## 2018-11-30 RX ORDER — NITROFURANTOIN 25; 75 MG/1; MG/1
100 CAPSULE ORAL 2 TIMES DAILY
Qty: 14 CAPSULE | Refills: 0 | Status: SHIPPED | OUTPATIENT
Start: 2018-11-30 | End: 2018-12-07

## 2018-12-03 ENCOUNTER — TELEPHONE (OUTPATIENT)
Dept: OBGYN CLINIC | Age: 73
End: 2018-12-03

## 2018-12-03 RX ORDER — CIPROFLOXACIN 500 MG/1
500 TABLET, FILM COATED ORAL 2 TIMES DAILY
Qty: 20 TABLET | Refills: 0 | Status: SHIPPED | OUTPATIENT
Start: 2018-12-03 | End: 2018-12-13

## 2018-12-12 DIAGNOSIS — E78.00 HYPERCHOLESTEROLEMIA: ICD-10-CM

## 2018-12-12 DIAGNOSIS — R73.03 PRE-DIABETES: ICD-10-CM

## 2018-12-12 DIAGNOSIS — E03.9 HYPOTHYROIDISM, UNSPECIFIED TYPE: ICD-10-CM

## 2018-12-12 DIAGNOSIS — E78.5 DYSLIPIDEMIA: Primary | ICD-10-CM

## 2019-01-02 ENCOUNTER — OFFICE VISIT (OUTPATIENT)
Dept: FAMILY MEDICINE CLINIC | Age: 74
End: 2019-01-02
Payer: MEDICARE

## 2019-01-02 VITALS
TEMPERATURE: 99.1 F | BODY MASS INDEX: 33.48 KG/M2 | DIASTOLIC BLOOD PRESSURE: 84 MMHG | SYSTOLIC BLOOD PRESSURE: 120 MMHG | WEIGHT: 189 LBS | RESPIRATION RATE: 14 BRPM | HEART RATE: 68 BPM

## 2019-01-02 DIAGNOSIS — I10 BENIGN HYPERTENSION: Primary | ICD-10-CM

## 2019-01-02 DIAGNOSIS — G47.09 OTHER INSOMNIA: ICD-10-CM

## 2019-01-02 DIAGNOSIS — E78.5 DYSLIPIDEMIA: ICD-10-CM

## 2019-01-02 DIAGNOSIS — F32.A ANXIETY AND DEPRESSION: ICD-10-CM

## 2019-01-02 DIAGNOSIS — I42.9 CARDIOMYOPATHY, UNSPECIFIED TYPE (HCC): ICD-10-CM

## 2019-01-02 DIAGNOSIS — Z91.81 AT HIGH RISK FOR FALLS: ICD-10-CM

## 2019-01-02 DIAGNOSIS — F41.9 ANXIETY AND DEPRESSION: ICD-10-CM

## 2019-01-02 PROCEDURE — 4040F PNEUMOC VAC/ADMIN/RCVD: CPT | Performed by: NURSE PRACTITIONER

## 2019-01-02 PROCEDURE — G8417 CALC BMI ABV UP PARAM F/U: HCPCS | Performed by: NURSE PRACTITIONER

## 2019-01-02 PROCEDURE — G8484 FLU IMMUNIZE NO ADMIN: HCPCS | Performed by: NURSE PRACTITIONER

## 2019-01-02 PROCEDURE — G8598 ASA/ANTIPLAT THER USED: HCPCS | Performed by: NURSE PRACTITIONER

## 2019-01-02 PROCEDURE — 1123F ACP DISCUSS/DSCN MKR DOCD: CPT | Performed by: NURSE PRACTITIONER

## 2019-01-02 PROCEDURE — 99214 OFFICE O/P EST MOD 30 MIN: CPT | Performed by: NURSE PRACTITIONER

## 2019-01-02 PROCEDURE — 3288F FALL RISK ASSESSMENT DOCD: CPT | Performed by: NURSE PRACTITIONER

## 2019-01-02 PROCEDURE — 1090F PRES/ABSN URINE INCON ASSESS: CPT | Performed by: NURSE PRACTITIONER

## 2019-01-02 PROCEDURE — G8427 DOCREV CUR MEDS BY ELIG CLIN: HCPCS | Performed by: NURSE PRACTITIONER

## 2019-01-02 PROCEDURE — 1036F TOBACCO NON-USER: CPT | Performed by: NURSE PRACTITIONER

## 2019-01-02 PROCEDURE — 0518F FALL PLAN OF CARE DOCD: CPT | Performed by: NURSE PRACTITIONER

## 2019-01-02 PROCEDURE — 3017F COLORECTAL CA SCREEN DOC REV: CPT | Performed by: NURSE PRACTITIONER

## 2019-01-02 PROCEDURE — G8400 PT W/DXA NO RESULTS DOC: HCPCS | Performed by: NURSE PRACTITIONER

## 2019-01-02 PROCEDURE — 1100F PTFALLS ASSESS-DOCD GE2>/YR: CPT | Performed by: NURSE PRACTITIONER

## 2019-01-02 RX ORDER — TRAZODONE HYDROCHLORIDE 150 MG/1
150 TABLET ORAL NIGHTLY
Qty: 30 TABLET | Refills: 0 | Status: SHIPPED | OUTPATIENT
Start: 2019-01-02 | End: 2019-02-04 | Stop reason: SDUPTHER

## 2019-01-02 RX ORDER — HYDROXYZINE HYDROCHLORIDE 25 MG/1
25 TABLET, FILM COATED ORAL EVERY 8 HOURS PRN
Qty: 60 TABLET | Refills: 0 | Status: SHIPPED | OUTPATIENT
Start: 2019-01-02 | End: 2019-03-03

## 2019-01-02 ASSESSMENT — ENCOUNTER SYMPTOMS
EYE DISCHARGE: 0
CHOKING: 0
TROUBLE SWALLOWING: 0
COLOR CHANGE: 0
COUGH: 0
EYE ITCHING: 0
PHOTOPHOBIA: 0
BLOOD IN STOOL: 0
NAUSEA: 0
ABDOMINAL PAIN: 0
CHEST TIGHTNESS: 0
EYE PAIN: 0
VOMITING: 0
DIARRHEA: 0
CONSTIPATION: 0
VOICE CHANGE: 0
EYE REDNESS: 0
SHORTNESS OF BREATH: 0

## 2019-01-16 LAB
ALBUMIN SERPL-MCNC: 4.1 G/DL
ALP BLD-CCNC: 52 U/L
ALT SERPL-CCNC: 14 U/L
ANION GAP SERPL CALCULATED.3IONS-SCNC: NORMAL MMOL/L
AST SERPL-CCNC: 13 U/L
AVERAGE GLUCOSE: 134
BILIRUB SERPL-MCNC: 0.7 MG/DL (ref 0.1–1.4)
BUN BLDV-MCNC: 9 MG/DL
CALCIUM SERPL-MCNC: 9.4 MG/DL
CHLORIDE BLD-SCNC: 101 MMOL/L
CHOLESTEROL, TOTAL: 121 MG/DL
CHOLESTEROL/HDL RATIO: 2.6
CO2: 30 MMOL/L
CREAT SERPL-MCNC: 0.62 MG/DL
GFR CALCULATED: NORMAL
GLUCOSE BLD-MCNC: 95 MG/DL
HBA1C MFR BLD: 6.3 %
HDLC SERPL-MCNC: 47 MG/DL (ref 35–70)
LDL CHOLESTEROL CALCULATED: 44 MG/DL (ref 0–160)
POTASSIUM SERPL-SCNC: 4.4 MMOL/L
SODIUM BLD-SCNC: 142 MMOL/L
TOTAL PROTEIN: 7.2
TRIGL SERPL-MCNC: 149 MG/DL
TSH SERPL DL<=0.05 MIU/L-ACNC: 0.9 UIU/ML
VLDLC SERPL CALC-MCNC: NORMAL MG/DL

## 2019-01-17 DIAGNOSIS — R73.03 PRE-DIABETES: ICD-10-CM

## 2019-01-17 DIAGNOSIS — E78.00 HYPERCHOLESTEROLEMIA: ICD-10-CM

## 2019-01-17 DIAGNOSIS — E03.9 HYPOTHYROIDISM, UNSPECIFIED TYPE: ICD-10-CM

## 2019-01-17 DIAGNOSIS — E78.5 DYSLIPIDEMIA: ICD-10-CM

## 2019-02-04 DIAGNOSIS — G47.09 OTHER INSOMNIA: ICD-10-CM

## 2019-02-04 DIAGNOSIS — F41.9 ANXIETY AND DEPRESSION: ICD-10-CM

## 2019-02-04 DIAGNOSIS — F32.A ANXIETY AND DEPRESSION: ICD-10-CM

## 2019-02-07 RX ORDER — TRAZODONE HYDROCHLORIDE 150 MG/1
150 TABLET ORAL NIGHTLY
Qty: 30 TABLET | Refills: 0 | Status: SHIPPED | OUTPATIENT
Start: 2019-02-07 | End: 2019-03-13 | Stop reason: SDUPTHER

## 2019-03-13 DIAGNOSIS — G47.09 OTHER INSOMNIA: ICD-10-CM

## 2019-03-13 DIAGNOSIS — F41.9 ANXIETY AND DEPRESSION: ICD-10-CM

## 2019-03-13 DIAGNOSIS — F32.A ANXIETY AND DEPRESSION: ICD-10-CM

## 2019-03-14 RX ORDER — TRAZODONE HYDROCHLORIDE 150 MG/1
150 TABLET ORAL NIGHTLY
Qty: 30 TABLET | Refills: 5 | Status: SHIPPED | OUTPATIENT
Start: 2019-03-14 | End: 2020-03-18

## 2019-05-06 DIAGNOSIS — R73.03 PRE-DIABETES: ICD-10-CM

## 2019-05-06 NOTE — TELEPHONE ENCOUNTER
LOV: 1/2/2019  LRF: 11/7/2018  RTO: No follow up listed    Health Maintenance   Topic Date Due    Shingles Vaccine (1 of 2) 05/20/1994    DTaP/Tdap/Td vaccine (2 - Tdap) 07/10/2019    A1C test (Diabetic or Prediabetic)  01/16/2020    TSH testing  01/16/2020    Potassium monitoring  01/16/2020    Creatinine monitoring  01/16/2020    Breast cancer screen  10/24/2020    Lipid screen  01/16/2024    Colon cancer screen colonoscopy  11/10/2027    Flu vaccine  Completed    Pneumococcal 65+ years Vaccine  Completed    DEXA (modify frequency per FRAX score)  Addressed             (applicable per patient's age: Cancer Screenings, Depression Screening, Fall Risk Screening, Immunizations)    Hemoglobin A1C (%)   Date Value   01/16/2019 6.3   08/09/2018 6.2   01/18/2018 5.9     LDL Calculated (mg/dL)   Date Value   01/16/2019 44     AST (U/L)   Date Value   01/16/2019 13     ALT (U/L)   Date Value   01/16/2019 14     BUN (mg/dL)   Date Value   01/16/2019 9      (goal A1C is < 7)   (goal LDL is <100) need 30-50% reduction from baseline     BP Readings from Last 3 Encounters:   01/02/19 120/84   11/27/18 128/74   04/20/18 98/60    (goal /80)      All Future Testing planned in [facility]:  Lab Frequency Next Occurrence   DEXA Vertebral Fracture Assessment Once 06/26/2019       Next Visit Date:  No future appointments.          Patient Active Problem List:     Multinodular goiter     Dyslipidemia     Essential hypertension     Osteopenia     Cardiomyopathy ([facility] [address]     FH: CAD (coronary artery disease)     Family history of cerebrovascular accident     Insomnia     Depression     Cardiac defibrillator in place     Presence of cardiac pacemaker     Anxiety     Benign hypertension     Calculus of kidney     Chronic coronary artery disease     History of atrial fibrillation     Hypercholesterolemia     High potassium     Polypharmacy     Hemorrhoids     Pre-diabetes     Hypothyroidism     Class 1 obesity without serious comorbidity with body mass index (BMI) of 34.0 to 34.9 in adult     History of dysplasia of vulva     Vulvar intraepithelial neoplasia (MARII) grade 3

## 2019-06-11 ENCOUNTER — TELEPHONE (OUTPATIENT)
Dept: FAMILY MEDICINE CLINIC | Age: 74
End: 2019-06-11

## 2019-06-11 DIAGNOSIS — E78.00 HYPERCHOLESTEROLEMIA: ICD-10-CM

## 2019-06-11 DIAGNOSIS — E03.9 HYPOTHYROIDISM, UNSPECIFIED TYPE: ICD-10-CM

## 2019-06-11 DIAGNOSIS — I10 BENIGN HYPERTENSION: ICD-10-CM

## 2019-06-11 DIAGNOSIS — I10 ESSENTIAL HYPERTENSION: ICD-10-CM

## 2019-06-11 DIAGNOSIS — R73.03 PRE-DIABETES: Primary | ICD-10-CM

## 2019-06-11 DIAGNOSIS — E78.5 DYSLIPIDEMIA: ICD-10-CM

## 2019-06-11 NOTE — TELEPHONE ENCOUNTER
Patient called in because she would like to have her blood work orders sent to [facility] lab behind us at [phone] to be completed before her appointment.      Orders pending approval.

## 2019-06-21 ENCOUNTER — TELEPHONE (OUTPATIENT)
Dept: FAMILY MEDICINE CLINIC | Age: 74
End: 2019-06-21

## 2019-07-10 LAB
ALBUMIN SERPL-MCNC: 3.8 G/DL
ALP BLD-CCNC: 55 U/L
ALT SERPL-CCNC: 15 U/L
ANION GAP SERPL CALCULATED.3IONS-SCNC: 8 MMOL/L
AST SERPL-CCNC: 17 U/L
AVERAGE GLUCOSE: 131
BASOPHILS ABSOLUTE: NORMAL /ΜL
BASOPHILS RELATIVE PERCENT: NORMAL %
BILIRUB SERPL-MCNC: 0.9 MG/DL (ref 0.1–1.4)
BUN BLDV-MCNC: 12 MG/DL
CALCIUM SERPL-MCNC: 9.2 MG/DL
CHLORIDE BLD-SCNC: 101 MMOL/L
CHOLESTEROL, TOTAL: 125 MG/DL
CHOLESTEROL/HDL RATIO: 2.7
CO2: 30 MMOL/L
CREAT SERPL-MCNC: 0.72 MG/DL
EOSINOPHILS ABSOLUTE: NORMAL /ΜL
EOSINOPHILS RELATIVE PERCENT: NORMAL %
GFR CALCULATED: NORMAL
GLUCOSE BLD-MCNC: 96 MG/DL
HBA1C MFR BLD: 6.2 %
HCT VFR BLD CALC: 39.4 % (ref 36–46)
HDLC SERPL-MCNC: 46 MG/DL (ref 35–70)
HEMOGLOBIN: 13.2 G/DL (ref 12–16)
LDL CHOLESTEROL CALCULATED: 43 MG/DL (ref 0–160)
LYMPHOCYTES ABSOLUTE: NORMAL /ΜL
LYMPHOCYTES RELATIVE PERCENT: NORMAL %
MCH RBC QN AUTO: 30.7 PG
MCHC RBC AUTO-ENTMCNC: 33.4 G/DL
MCV RBC AUTO: 92 FL
MONOCYTES ABSOLUTE: NORMAL /ΜL
MONOCYTES RELATIVE PERCENT: NORMAL %
NEUTROPHILS ABSOLUTE: NORMAL /ΜL
NEUTROPHILS RELATIVE PERCENT: NORMAL %
PLATELET # BLD: 237 K/ΜL
PMV BLD AUTO: 9.6 FL
POTASSIUM SERPL-SCNC: 4.7 MMOL/L
RBC # BLD: 4.29 10^6/ΜL
SODIUM BLD-SCNC: 139 MMOL/L
T4 FREE: 0.83
TOTAL PROTEIN: 7.4
TRIGL SERPL-MCNC: 180 MG/DL
TSH SERPL DL<=0.05 MIU/L-ACNC: 0.87 UIU/ML
VLDLC SERPL CALC-MCNC: NORMAL MG/DL
WBC # BLD: 7.9 10^3/ML

## 2019-07-12 DIAGNOSIS — R73.03 PRE-DIABETES: ICD-10-CM

## 2019-07-12 DIAGNOSIS — E03.9 HYPOTHYROIDISM, UNSPECIFIED TYPE: ICD-10-CM

## 2019-07-12 DIAGNOSIS — E78.5 DYSLIPIDEMIA: ICD-10-CM

## 2019-07-12 DIAGNOSIS — I10 ESSENTIAL HYPERTENSION: ICD-10-CM

## 2019-07-12 DIAGNOSIS — I10 BENIGN HYPERTENSION: ICD-10-CM

## 2019-07-12 DIAGNOSIS — E78.00 HYPERCHOLESTEROLEMIA: ICD-10-CM

## 2019-08-16 ENCOUNTER — OFFICE VISIT (OUTPATIENT)
Dept: FAMILY MEDICINE CLINIC | Age: 74
End: 2019-08-16
Payer: MEDICARE

## 2019-08-16 VITALS
OXYGEN SATURATION: 97 % | BODY MASS INDEX: 34.91 KG/M2 | HEIGHT: 63 IN | DIASTOLIC BLOOD PRESSURE: 74 MMHG | HEART RATE: 68 BPM | SYSTOLIC BLOOD PRESSURE: 118 MMHG | RESPIRATION RATE: 16 BRPM | WEIGHT: 197 LBS

## 2019-08-16 DIAGNOSIS — F32.0 CURRENT MILD EPISODE OF MAJOR DEPRESSIVE DISORDER WITHOUT PRIOR EPISODE (HCC): ICD-10-CM

## 2019-08-16 DIAGNOSIS — E03.9 HYPOTHYROIDISM, UNSPECIFIED TYPE: ICD-10-CM

## 2019-08-16 DIAGNOSIS — E78.00 HYPERCHOLESTEROLEMIA: ICD-10-CM

## 2019-08-16 DIAGNOSIS — I10 ESSENTIAL HYPERTENSION: Primary | ICD-10-CM

## 2019-08-16 DIAGNOSIS — R73.03 PRE-DIABETES: ICD-10-CM

## 2019-08-16 PROCEDURE — 1090F PRES/ABSN URINE INCON ASSESS: CPT | Performed by: NURSE PRACTITIONER

## 2019-08-16 PROCEDURE — 1123F ACP DISCUSS/DSCN MKR DOCD: CPT | Performed by: NURSE PRACTITIONER

## 2019-08-16 PROCEDURE — 4040F PNEUMOC VAC/ADMIN/RCVD: CPT | Performed by: NURSE PRACTITIONER

## 2019-08-16 PROCEDURE — 3017F COLORECTAL CA SCREEN DOC REV: CPT | Performed by: NURSE PRACTITIONER

## 2019-08-16 PROCEDURE — 99214 OFFICE O/P EST MOD 30 MIN: CPT | Performed by: NURSE PRACTITIONER

## 2019-08-16 PROCEDURE — G8427 DOCREV CUR MEDS BY ELIG CLIN: HCPCS | Performed by: NURSE PRACTITIONER

## 2019-08-16 PROCEDURE — 1036F TOBACCO NON-USER: CPT | Performed by: NURSE PRACTITIONER

## 2019-08-16 PROCEDURE — G8417 CALC BMI ABV UP PARAM F/U: HCPCS | Performed by: NURSE PRACTITIONER

## 2019-08-16 PROCEDURE — G8598 ASA/ANTIPLAT THER USED: HCPCS | Performed by: NURSE PRACTITIONER

## 2019-08-16 PROCEDURE — G8400 PT W/DXA NO RESULTS DOC: HCPCS | Performed by: NURSE PRACTITIONER

## 2019-08-16 RX ORDER — IRBESARTAN 75 MG/1
75 TABLET ORAL DAILY
COMMUNITY
End: 2021-01-01 | Stop reason: ALTCHOICE

## 2019-08-16 ASSESSMENT — ENCOUNTER SYMPTOMS
CHEST TIGHTNESS: 0
COUGH: 0
CHOKING: 0
CONSTIPATION: 0
NAUSEA: 0
COLOR CHANGE: 0
EYE DISCHARGE: 0
VOMITING: 0
SHORTNESS OF BREATH: 0
EYE REDNESS: 0
EYE ITCHING: 0
ABDOMINAL PAIN: 0
TROUBLE SWALLOWING: 0
BLOOD IN STOOL: 0
EYE PAIN: 0
PHOTOPHOBIA: 0
VOICE CHANGE: 0
DIARRHEA: 0

## 2019-08-16 NOTE — PROGRESS NOTES
10-14 = Moderate depression, 15-19 = Moderately severe depression, 20-27 = Severe depression    Current Outpatient Medications   Medication Sig Dispense Refill    irbesartan (AVAPRO) 75 MG tablet Take 75 mg by mouth 2 times daily      metFORMIN (GLUCOPHAGE) 500 MG tablet Take 1 tablet by mouth 2 times daily (with meals) 180 tablet 1    traZODone (DESYREL) 150 MG tablet Take 1 tablet by mouth nightly 30 tablet 5    levothyroxine (SYNTHROID) 50 MCG tablet Take 50 mcg by mouth Daily      carvedilol (COREG) 25 MG tablet Take 25 mg by mouth 2 times daily (with meals)      aspirin 81 MG tablet Take 81 mg by mouth Daily with supper       rosuvastatin (CRESTOR) 10 MG tablet Take 10 mg by mouth Daily with supper        No current facility-administered medications for this visit. KETAN Nuno presents to the office today for routine follow-up on chronic conditions including hypertension hyperlipidemia prediabetes anxiety depression insomnia and weight gain. Taking medication as prescribed. Denies side effects. Does follow-up with cardiology. They switched her blood pressure medication to Avapro due to recall. Overall doing well. Some increased fatigue. Gaining weight. Denies shortness of breath or chest pain. Denies leg swelling. Sleeping well trazodone increase. Review of Systems   Constitutional: Negative for activity change, appetite change, chills, diaphoresis, fatigue and fever. Weight gain. HENT: Negative for ear discharge, ear pain, trouble swallowing and voice change. Eyes: Negative for photophobia, pain, discharge, redness, itching and visual disturbance. Respiratory: Negative for cough, choking, chest tightness and shortness of breath. Cardiovascular: Negative for chest pain, palpitations and leg swelling. Gastrointestinal: Negative for abdominal pain, blood in stool, constipation, diarrhea, nausea and vomiting. Endocrine: Negative.     Genitourinary: Negative for exercise. BP: 118/74. Blood pressure is normal. Treatment plan consists of No treatment change needed. Fall Risk 1/2/2019 10/20/2017 6/27/2016   2 or more falls in past year? yes no no   Fall with injury in past year? no no no     The patient does not have a history of falls. I did not - not indicated , complete a risk assessment for falls. A plan of care for falls No Treatment plan indicated    Lab Results   Component Value Date    LDLCALC 43 07/10/2019    (goal LDL reduction with dx if diabetes is 50% LDL reduction)    PHQ Scores 11/27/2018 10/20/2017 6/27/2016   PHQ2 Score 0 0 1   PHQ9 Score 0 0 1     Interpretation of Total Score Depression Severity: 1-4 = Minimal depression, 5-9 = Mild depression, 10-14 = Moderate depression, 15-19 = Moderately severe depression, 20-27 = Severe depression      Quality & Risk Score Accuracy    Visit Dx:  F32.0 - Current mild episode of major depressive disorder without prior episode ([facility] [address]  Assessment and plan:  Stable based upon symptoms and exam. Continue current treatment plan and follow up at least yearly.   Last edited 08/16/19 08:34 EDT by BRISSA Sue CNP           Electronically signed by BRISSA Sue CNP on 8/16/2019 at 8:58 AM

## 2019-11-04 DIAGNOSIS — R73.03 PRE-DIABETES: ICD-10-CM

## 2019-11-15 ENCOUNTER — PATIENT MESSAGE (OUTPATIENT)
Dept: FAMILY MEDICINE CLINIC | Age: 74
End: 2019-11-15

## 2019-12-04 ENCOUNTER — TELEPHONE (OUTPATIENT)
Dept: OBGYN CLINIC | Age: 74
End: 2019-12-04

## 2019-12-05 ENCOUNTER — OFFICE VISIT (OUTPATIENT)
Dept: OBGYN CLINIC | Age: 74
End: 2019-12-05
Payer: MEDICARE

## 2019-12-05 VITALS
DIASTOLIC BLOOD PRESSURE: 75 MMHG | HEART RATE: 81 BPM | SYSTOLIC BLOOD PRESSURE: 131 MMHG | BODY MASS INDEX: 35.7 KG/M2 | HEIGHT: 62 IN | WEIGHT: 194 LBS

## 2019-12-05 DIAGNOSIS — Z12.31 ENCOUNTER FOR SCREENING MAMMOGRAM FOR MALIGNANT NEOPLASM OF BREAST: Primary | ICD-10-CM

## 2019-12-05 DIAGNOSIS — Z01.419 ENCOUNTER FOR GYNECOLOGICAL EXAMINATION WITHOUT ABNORMAL FINDING: ICD-10-CM

## 2019-12-05 PROCEDURE — G0101 CA SCREEN;PELVIC/BREAST EXAM: HCPCS | Performed by: OBSTETRICS & GYNECOLOGY

## 2019-12-05 PROCEDURE — G8484 FLU IMMUNIZE NO ADMIN: HCPCS | Performed by: OBSTETRICS & GYNECOLOGY

## 2019-12-05 ASSESSMENT — PATIENT HEALTH QUESTIONNAIRE - PHQ9
1. LITTLE INTEREST OR PLEASURE IN DOING THINGS: 1
SUM OF ALL RESPONSES TO PHQ QUESTIONS 1-9: 2
2. FEELING DOWN, DEPRESSED OR HOPELESS: 1
SUM OF ALL RESPONSES TO PHQ QUESTIONS 1-9: 2
SUM OF ALL RESPONSES TO PHQ9 QUESTIONS 1 & 2: 2

## 2020-01-01 ENCOUNTER — TELEPHONE (OUTPATIENT)
Dept: FAMILY MEDICINE CLINIC | Age: 75
End: 2020-01-01

## 2020-01-01 DIAGNOSIS — E78.5 DYSLIPIDEMIA: ICD-10-CM

## 2020-01-01 DIAGNOSIS — R73.03 PRE-DIABETES: ICD-10-CM

## 2020-01-01 DIAGNOSIS — E03.9 HYPOTHYROIDISM, UNSPECIFIED TYPE: Primary | ICD-10-CM

## 2020-01-01 DIAGNOSIS — I10 ESSENTIAL HYPERTENSION: ICD-10-CM

## 2020-01-01 RX ORDER — TRAZODONE HYDROCHLORIDE 150 MG/1
150 TABLET ORAL NIGHTLY
Qty: 30 TABLET | Refills: 0 | Status: SHIPPED | OUTPATIENT
Start: 2020-01-01 | End: 2020-01-01

## 2020-01-01 RX ORDER — TRAZODONE HYDROCHLORIDE 150 MG/1
150 TABLET ORAL NIGHTLY
Qty: 30 TABLET | Refills: 5 | Status: SHIPPED | OUTPATIENT
Start: 2020-01-01 | End: 2021-01-01

## 2020-01-01 RX ORDER — TRAZODONE HYDROCHLORIDE 150 MG/1
TABLET ORAL
Qty: 30 TABLET | Refills: 0 | Status: SHIPPED | OUTPATIENT
Start: 2020-01-01 | End: 2020-01-01

## 2020-01-15 LAB
ALBUMIN SERPL-MCNC: 4.2 G/DL
ALP BLD-CCNC: 52 U/L
ALT SERPL-CCNC: 14 U/L
AST SERPL-CCNC: 13 U/L
AVERAGE GLUCOSE: 128
BASOPHILS ABSOLUTE: NORMAL
BASOPHILS RELATIVE PERCENT: NORMAL
BILIRUB SERPL-MCNC: 0.9 MG/DL (ref 0.1–1.4)
BUN BLDV-MCNC: 12 MG/DL
CALCIUM SERPL-MCNC: 9.7 MG/DL
CHLORIDE BLD-SCNC: 103 MMOL/L
CHOLESTEROL, FASTING: 121
CO2: 33 MMOL/L
CREAT SERPL-MCNC: 0.62 MG/DL
EOSINOPHILS ABSOLUTE: NORMAL
EOSINOPHILS RELATIVE PERCENT: NORMAL
GLUCOSE FASTING: 113 MG/DL
HBA1C MFR BLD: 6.1 %
HCT VFR BLD CALC: 39.3 % (ref 36–46)
HDLC SERPL-MCNC: 44 MG/DL (ref 35–70)
HEMOGLOBIN: 13.1 G/DL (ref 12–16)
LDL CHOLESTEROL CALCULATED: 48 MG/DL (ref 0–160)
LYMPHOCYTES ABSOLUTE: NORMAL
LYMPHOCYTES RELATIVE PERCENT: NORMAL
MCH RBC QN AUTO: 31.7 PG
MCHC RBC AUTO-ENTMCNC: 33.3 G/DL
MCV RBC AUTO: 95 FL
MONOCYTES ABSOLUTE: NORMAL
MONOCYTES RELATIVE PERCENT: NORMAL
NEUTROPHILS ABSOLUTE: NORMAL
NEUTROPHILS RELATIVE PERCENT: NORMAL
PLATELET # BLD: 229 K/ΜL
PMV BLD AUTO: 9.5 FL
POTASSIUM SERPL-SCNC: 4.8 MMOL/L
RBC # BLD: 4.12 10^6/ΜL
SODIUM BLD-SCNC: 142 MMOL/L
TOTAL PROTEIN: 7.3 G/DL (ref 6.4–8.2)
TRIGLYCERIDE, FASTING: 145
WBC # BLD: 9 10^3/ML

## 2020-03-18 RX ORDER — TRAZODONE HYDROCHLORIDE 150 MG/1
TABLET ORAL
Qty: 30 TABLET | Refills: 4 | Status: SHIPPED | OUTPATIENT
Start: 2020-03-18 | End: 2020-01-01

## 2020-03-18 NOTE — TELEPHONE ENCOUNTER
Last visit: 8/16/19  Last Med refill: 8/14/19  Does patient have enough medication for 72 hours: No    Next Visit Date:  No future appointments.     Health Maintenance   Topic Date Due    Shingles Vaccine (1 of 2) 05/20/1994    Annual Wellness Visit (AWV)  05/29/2019    DTaP/Tdap/Td vaccine (2 - Tdap) 07/10/2019    TSH testing  07/10/2020    A1C test (Diabetic or Prediabetic)  01/15/2021    Lipid screen  01/15/2021    Potassium monitoring  01/15/2021    Creatinine monitoring  01/15/2021    Colon cancer screen colonoscopy  11/10/2027    Flu vaccine  Completed    Pneumococcal 65+ years Vaccine  Completed    DEXA (modify frequency per FRAX score)  Addressed    Hepatitis A vaccine  Aged Out    Hepatitis B vaccine  Aged Out    Hib vaccine  Aged Out    Meningococcal (ACWY) vaccine  Aged Out       Hemoglobin A1C (%)   Date Value   01/15/2020 6.1   07/10/2019 6.2   01/16/2019 6.3             ( goal A1C is < 7)   No results found for: LABMICR  LDL Calculated (mg/dL)   Date Value   01/15/2020 48   07/10/2019 43       (goal LDL is <100)   AST (U/L)   Date Value   01/15/2020 13     ALT (U/L)   Date Value   01/15/2020 14     BUN (mg/dL)   Date Value   01/15/2020 12     BP Readings from Last 3 Encounters:   12/05/19 131/75   08/16/19 118/74   01/02/19 120/84          (goal 120/80)    All Future Testing planned in [facility]  Lab Frequency Next Occurrence   SANTI DIGITAL SCREEN W OR WO CAD BILATERAL Once 06/05/2020               Patient Active Problem List:     Multinodular goiter     Dyslipidemia     Essential hypertension     Osteopenia     Cardiomyopathy ([facility] [address]     FH: CAD (coronary artery disease)     Family history of cerebrovascular accident     Insomnia     Depression     Cardiac defibrillator in place     Presence of cardiac pacemaker     Anxiety     Benign hypertension     Calculus of kidney     Chronic coronary artery disease     History of atrial fibrillation     Hypercholesterolemia     High potassium

## 2020-06-04 NOTE — TELEPHONE ENCOUNTER
Lov: 6/2/20  Lrf: 8/16/19  Na: 6/26/20  Health Maintenance   Topic Date Due    Shingles Vaccine (1 of 2) 05/20/1994    Annual Wellness Visit (AWV)  05/29/2019    DTaP/Tdap/Td vaccine (2 - Tdap) 07/10/2019    TSH testing  07/10/2020    Lipid screen  01/15/2021    Potassium monitoring  01/15/2021    Creatinine monitoring  01/15/2021    Flu vaccine  Completed    Pneumococcal 65+ years Vaccine  Completed    DEXA (modify frequency per FRAX score)  Addressed    Hepatitis A vaccine  Aged Out    Hepatitis B vaccine  Aged Out    Hib vaccine  Aged Out    Meningococcal (ACWY) vaccine  Aged Out             (applicable per patient's age: Cancer Screenings, Depression Screening, Fall Risk Screening, Immunizations)    Hemoglobin A1C (%)   Date Value   01/15/2020 6.1   07/10/2019 6.2   01/16/2019 6.3     LDL Calculated (mg/dL)   Date Value   01/15/2020 48     AST (U/L)   Date Value   01/15/2020 13     ALT (U/L)   Date Value   01/15/2020 14     BUN (mg/dL)   Date Value   01/15/2020 12      (goal A1C is < 7)   (goal LDL is <100) need 30-50% reduction from baseline     BP Readings from Last 3 Encounters:   12/05/19 131/75   08/16/19 118/74   01/02/19 120/84    (goal /80)      All Future Testing planned in [facility]:  Lab Frequency Next Occurrence   SANTI DIGITAL SCREEN W OR WO CAD BILATERAL Once 06/05/2020       Next Visit Date:  Future Appointments   Date Time Provider Aime Hogan   6/26/2020  8:30 AM Levorn [place], APRN - CNP [id number]            Patient Active Problem List:     Multinodular goiter     Dyslipidemia     Essential hypertension     Osteopenia     Cardiomyopathy ([facility] [address]     FH: CAD (coronary artery disease)     Family history of cerebrovascular accident     Insomnia     Depression     Cardiac defibrillator in place     Presence of cardiac pacemaker     Anxiety     Benign hypertension     Calculus of kidney     Chronic coronary artery disease     History of atrial fibrillation Hypercholesterolemia     High potassium     Polypharmacy     Hemorrhoids     Pre-diabetes     Hypothyroidism     Class 1 obesity without serious comorbidity with body mass index (BMI) of 34.0 to 34.9 in adult     History of dysplasia of vulva     Vulvar intraepithelial neoplasia (MARII) grade 3

## 2020-06-26 ENCOUNTER — OFFICE VISIT (OUTPATIENT)
Dept: FAMILY MEDICINE CLINIC | Age: 75
End: 2020-06-26
Payer: MEDICARE

## 2020-06-26 VITALS
BODY MASS INDEX: 35.79 KG/M2 | HEART RATE: 68 BPM | DIASTOLIC BLOOD PRESSURE: 75 MMHG | HEIGHT: 63 IN | SYSTOLIC BLOOD PRESSURE: 120 MMHG | OXYGEN SATURATION: 96 % | WEIGHT: 202 LBS

## 2020-06-26 PROBLEM — E66.01 MORBIDLY OBESE (HCC): Status: ACTIVE | Noted: 2018-04-20

## 2020-06-26 PROBLEM — I47.10 SUPRAVENTRICULAR TACHYCARDIA: Status: ACTIVE | Noted: 2020-06-26

## 2020-06-26 PROBLEM — F32.0 CURRENT MILD EPISODE OF MAJOR DEPRESSIVE DISORDER WITHOUT PRIOR EPISODE (HCC): Status: ACTIVE | Noted: 2020-06-26

## 2020-06-26 PROBLEM — I47.1 SUPRAVENTRICULAR TACHYCARDIA (HCC): Status: ACTIVE | Noted: 2020-06-26

## 2020-06-26 PROCEDURE — 1090F PRES/ABSN URINE INCON ASSESS: CPT | Performed by: NURSE PRACTITIONER

## 2020-06-26 PROCEDURE — 1123F ACP DISCUSS/DSCN MKR DOCD: CPT | Performed by: NURSE PRACTITIONER

## 2020-06-26 PROCEDURE — G8400 PT W/DXA NO RESULTS DOC: HCPCS | Performed by: NURSE PRACTITIONER

## 2020-06-26 PROCEDURE — 1036F TOBACCO NON-USER: CPT | Performed by: NURSE PRACTITIONER

## 2020-06-26 PROCEDURE — 99214 OFFICE O/P EST MOD 30 MIN: CPT | Performed by: NURSE PRACTITIONER

## 2020-06-26 PROCEDURE — 4040F PNEUMOC VAC/ADMIN/RCVD: CPT | Performed by: NURSE PRACTITIONER

## 2020-06-26 PROCEDURE — G8417 CALC BMI ABV UP PARAM F/U: HCPCS | Performed by: NURSE PRACTITIONER

## 2020-06-26 PROCEDURE — G8427 DOCREV CUR MEDS BY ELIG CLIN: HCPCS | Performed by: NURSE PRACTITIONER

## 2020-06-26 RX ORDER — BUSPIRONE HYDROCHLORIDE 5 MG/1
5 TABLET ORAL 2 TIMES DAILY
Qty: 60 TABLET | Refills: 3 | Status: SHIPPED | OUTPATIENT
Start: 2020-06-26 | End: 2020-07-26

## 2020-06-26 SDOH — ECONOMIC STABILITY: INCOME INSECURITY: HOW HARD IS IT FOR YOU TO PAY FOR THE VERY BASICS LIKE FOOD, HOUSING, MEDICAL CARE, AND HEATING?: NOT HARD AT ALL

## 2020-06-26 ASSESSMENT — ENCOUNTER SYMPTOMS
EYE ITCHING: 0
VOICE CHANGE: 0
VOMITING: 0
TROUBLE SWALLOWING: 0
COUGH: 0
NAUSEA: 0
ABDOMINAL PAIN: 0
CHOKING: 0
EYE PAIN: 0
SHORTNESS OF BREATH: 0
DIARRHEA: 0
EYE DISCHARGE: 0
BLOOD IN STOOL: 0
CONSTIPATION: 0
CHEST TIGHTNESS: 0
PHOTOPHOBIA: 0
COLOR CHANGE: 0
EYE REDNESS: 0

## 2020-06-26 NOTE — PROGRESS NOTES
Subjective:      Patient ID: Bry Zacarias is a 67 y.o. female. Visit Information    Have you changed or started any medications since your last visit including any over-the-counter medicines, vitamins, or herbal medicines? no   Are you having any side effects from any of your medications? -  no  Have you stopped taking any of your medications? Is so, why? -  no    Have you seen any other physician or provider since your last visit? No  Have you had any other diagnostic tests since your last visit? No  Have you been seen in the emergency room and/or had an admission to a hospital since we last saw you? No  Have you had your routine dental cleaning in the past 6 months? no    Have you activated your APR account? If not, what are your barriers?  Yes     Patient Care Team:  BRISSA Gamboa CNP as PCP - General (Family Medicine)  BRISSA Gamboa CNP as PCP - [facility] Provider    Medical History Review  Past Medical, Family, and Social History reviewed and does not contribute to the patient presenting condition    Health Maintenance   Topic Date Due    Shingles Vaccine (1 of 2) 05/20/1994    Annual Wellness Visit (AWV)  05/29/2019    DTaP/Tdap/Td vaccine (2 - Tdap) 07/10/2019    TSH testing  07/10/2020    Lipid screen  01/15/2021    Potassium monitoring  01/15/2021    Creatinine monitoring  01/15/2021    Flu vaccine  Completed    Pneumococcal 65+ years Vaccine  Completed    DEXA (modify frequency per FRAX score)  Addressed    Hepatitis A vaccine  Aged Out    Hepatitis B vaccine  Aged Out    Hib vaccine  Aged Out    Meningococcal (ACWY) vaccine  Aged Out     /75 (Site: Right Upper Arm, Position: Sitting, Cuff Size: Medium Adult)   Pulse 68   Ht 5' 3\" (1.6 m)   Wt 202 lb (91.6 kg)   SpO2 96%   BMI 35.78 kg/m²      PHQ Scores 12/5/2019 11/27/2018 10/20/2017 6/27/2016   PHQ2 Score 2 0 0 1   PHQ9 Score 2 0 0 1     Interpretation of Total Score DepressionSeverity: 1-4 = Minimal depression, 5-9 = Mild depression, 10-14 = Moderate depression, 15-19 = Moderately severe depression, 20-27 = Severe depression    Current Outpatient Medications   Medication Sig Dispense Refill    busPIRone (BUSPAR) 5 MG tablet Take 1 tablet by mouth 2 times daily 60 tablet 3    metFORMIN (GLUCOPHAGE) 500 MG tablet TAKE ONE TABLET BY MOUTH TWICE A DAY WITH MEALS 180 tablet 0    traZODone (DESYREL) 150 MG tablet TAKE ONE TABLET BY MOUTH ONCE NIGHTLY 30 tablet 4    irbesartan (AVAPRO) 75 MG tablet Take 75 mg by mouth 2 times daily      levothyroxine (SYNTHROID) 50 MCG tablet Take 50 mcg by mouth Daily      carvedilol (COREG) 25 MG tablet Take 25 mg by mouth 2 times daily (with meals)      aspirin 81 MG tablet Take 81 mg by mouth Daily with supper       rosuvastatin (CRESTOR) 10 MG tablet Take 10 mg by mouth Daily with supper        No current facility-administered medications for this visit. KETAN    Alis Serra presents to the office today for routine follow-up on chronic conditions including hypertension, prediabetes, hypothyroidism, obesity, anxiety and depression. Taking medication as prescribed. Tells me that she would like to take something for anxiety. Sleeping well. Does have anxiety on a daily basis. Does not feel like Atarax did anything for her. Some increased weight gain during COVID. Not leaving the house much. Following up with OB/GYN. Review of Systems   Constitutional: Negative for activity change, appetite change, chills, diaphoresis, fatigue and fever. Weight gain. HENT: Negative for ear discharge, ear pain, trouble swallowing and voice change. Eyes: Negative for photophobia, pain, discharge, redness, itching and visual disturbance. Respiratory: Negative for cough, choking, chest tightness and shortness of breath. Cardiovascular: Negative for chest pain, palpitations and leg swelling.    Gastrointestinal: Negative for abdominal pain, blood in stool, Palpations: Abdomen is soft. Tenderness: There is no abdominal tenderness. There is no guarding or rebound. Musculoskeletal:         General: No tenderness. Lymphadenopathy:      Cervical: No cervical adenopathy. Skin:     General: Skin is warm and dry. Findings: No rash. Neurological:      Mental Status: She is alert and oriented to person, place, and time. Motor: No atrophy or abnormal muscle tone. Coordination: Coordination normal.      Gait: Gait normal.   Psychiatric:         Speech: Speech normal.         Behavior: Behavior normal.         Assessment / Plan:     1. At high risk for falls      2. Anxiety    - busPIRone (BUSPAR) 5 MG tablet; Take 1 tablet by mouth 2 times daily  Dispense: 60 tablet; Refill: 3    3. Dyslipidemia    - Comprehensive Metabolic Panel, Fasting; Future    4. Essential hypertension    - Hemoglobin A1C; Future  - Comprehensive Metabolic Panel, Fasting; Future  - Lipid, Fasting; Future    5. Pre-diabetes    - Hemoglobin A1C; Future    6. Anxiety and depression      7. Hypothyroidism, unspecified type    - TSH; Future    8. Cardiomyopathy, unspecified type ([address]      9. Current mild episode of major depressive disorder without prior episode ([address]      10. Supraventricular tachycardia ([address]      11. Morbidly obese ([address]      Start buspar 2 times a day. Routine fasting blood work. Follow-up with OB/GYN and cardiology as planned. Follow up in 4-6 weeks. Return in about 6 weeks (around 8/7/2020).           Electronically signed by BRISSA Navarro CNP on 6/26/2020 at 9:39 AM

## 2020-07-15 LAB
ALBUMIN SERPL-MCNC: 3.9 G/DL
ALP BLD-CCNC: 47 U/L
ALT SERPL-CCNC: 19 U/L
AST SERPL-CCNC: 18 U/L
AVERAGE GLUCOSE: 134
BILIRUB SERPL-MCNC: 0.7 MG/DL (ref 0.1–1.4)
BUN BLDV-MCNC: 9 MG/DL
CALCIUM SERPL-MCNC: 9.6 MG/DL
CHLORIDE BLD-SCNC: 101 MMOL/L
CHOLESTEROL, FASTING: 116
CO2: 32 MMOL/L
CREAT SERPL-MCNC: 0.72 MG/DL
GLUCOSE FASTING: 93 MG/DL
HBA1C MFR BLD: 6.3 %
HDLC SERPL-MCNC: 51 MG/DL (ref 35–70)
LDL CHOLESTEROL CALCULATED: 43 MG/DL (ref 0–160)
POTASSIUM SERPL-SCNC: 4.8 MMOL/L
SODIUM BLD-SCNC: 142 MMOL/L
TOTAL PROTEIN: 7.5 G/DL (ref 6.4–8.2)
TRIGLYCERIDE, FASTING: 110
TSH SERPL DL<=0.05 MIU/L-ACNC: 0.69 UIU/ML

## 2020-10-22 NOTE — TELEPHONE ENCOUNTER
Hypercholesterolemia     High potassium     Polypharmacy     Hemorrhoids     Pre-diabetes     Hypothyroidism     Morbidly obese (HCC)     History of dysplasia of vulva     Vulvar intraepithelial neoplasia (MARII) grade 3     Current mild episode of major depressive disorder without prior episode ([address]     Supraventricular tachycardia ([address]

## 2020-12-01 NOTE — TELEPHONE ENCOUNTER
Last visit: 6/26/2020  Last Med refill: 10/22/2020  Does patient have enough medication for 72 hours: No:     Next Visit Date:  Future Appointments   Date Time Provider Aime Hogan   1/11/2021  8:30 AM BRISSA Morel - CNP [address] Maintenance   Topic Date Due    Shingles Vaccine (1 of 2) 05/20/1994    Annual Wellness Visit (AWV)  05/29/2019    DTaP/Tdap/Td vaccine (2 - Tdap) 07/10/2019    Flu vaccine (1) 09/01/2020    Lipid screen  07/15/2021    TSH testing  07/15/2021    Potassium monitoring  07/15/2021    Creatinine monitoring  07/15/2021    Pneumococcal 65+ years Vaccine  Completed    DEXA (modify frequency per FRAX score)  Addressed    Hepatitis A vaccine  Aged Out    Hepatitis B vaccine  Aged Out    Hib vaccine  Aged Out    Meningococcal (ACWY) vaccine  Aged Out       Hemoglobin A1C (%)   Date Value   07/15/2020 6.3   01/15/2020 6.1   07/10/2019 6.2             ( goal A1C is < 7)   No results found for: LABMICR  LDL Calculated (mg/dL)   Date Value   07/15/2020 43   01/15/2020 48       (goal LDL is <100)   AST (U/L)   Date Value   07/15/2020 18     ALT (U/L)   Date Value   07/15/2020 19     BUN (mg/dL)   Date Value   07/15/2020 9     BP Readings from Last 3 Encounters:   06/26/20 120/75   12/05/19 131/75   08/16/19 118/74          (goal 120/80)    All Future Testing planned in [facility]  Lab Frequency Next Occurrence   SANTI DIGITAL SCREEN W OR WO CAD BILATERAL Once 12/22/2020   TSH Once 02/11/2021   Lipid, Fasting Once 02/11/2021   Hemoglobin A1C Once 02/11/2021   Comprehensive Metabolic Panel, Fasting Once 02/11/2021               Patient Active Problem List:     Multinodular goiter     Dyslipidemia     Osteopenia     Cardiomyopathy (HCC)     FH: CAD (coronary artery disease)     Family history of cerebrovascular accident     Insomnia     Depression     Cardiac defibrillator in place     Presence of cardiac pacemaker     Anxiety     Benign hypertension Calculus of kidney     Chronic coronary artery disease     History of atrial fibrillation     Hypercholesterolemia     High potassium     Polypharmacy     Hemorrhoids     Pre-diabetes     Hypothyroidism     Morbidly obese (HCC)     History of dysplasia of vulva     Vulvar intraepithelial neoplasia (MARII) grade 3     Current mild episode of major depressive disorder without prior episode ([facility]     Supraventricular tachycardia ([facility]

## 2020-12-31 NOTE — TELEPHONE ENCOUNTER
Last visit:6/26/2020  Last Med refill: 12/1/20  Does patient have enough medication for 72 hours: No:     Next Visit Date:  Future Appointments   Date Time Provider Aime Hogan   1/11/2021  8:30 AM BRISSA Moore - DEL [place] [address] Maintenance   Topic Date Due    Shingles Vaccine (1 of 2) 05/20/1994    Annual Wellness Visit (AWV)  05/29/2019    DTaP/Tdap/Td vaccine (2 - Tdap) 07/10/2019    Flu vaccine (1) 09/01/2020    Lipid screen  07/15/2021    TSH testing  07/15/2021    Potassium monitoring  07/15/2021    Creatinine monitoring  07/15/2021    Pneumococcal 65+ years Vaccine  Completed    Hepatitis C screen  Completed    DEXA (modify frequency per FRAX score)  Addressed    Hepatitis A vaccine  Aged Out    Hepatitis B vaccine  Aged Out    Hib vaccine  Aged Out    Meningococcal (ACWY) vaccine  Aged Out       Hemoglobin A1C (%)   Date Value   07/15/2020 6.3   01/15/2020 6.1   07/10/2019 6.2             ( goal A1C is < 7)   No results found for: LABMICR  LDL Calculated (mg/dL)   Date Value   07/15/2020 43   01/15/2020 48       (goal LDL is <100)   AST (U/L)   Date Value   07/15/2020 18     ALT (U/L)   Date Value   07/15/2020 19     BUN (mg/dL)   Date Value   07/15/2020 9     BP Readings from Last 3 Encounters:   06/26/20 120/75   12/05/19 131/75   08/16/19 118/74          (goal 120/80)    All Future Testing planned in [facility]  Lab Frequency Next Occurrence   SANTI DIGITAL SCREEN W OR WO CAD BILATERAL Once 12/22/2020   TSH Once 02/11/2021   Lipid, Fasting Once 02/11/2021   Hemoglobin A1C Once 02/11/2021   Comprehensive Metabolic Panel, Fasting Once 02/11/2021               Patient Active Problem List:     Multinodular goiter     Dyslipidemia     Osteopenia     Cardiomyopathy (HCC)     FH: CAD (coronary artery disease)     Family history of cerebrovascular accident     Insomnia     Depression     Cardiac defibrillator in place     Presence of cardiac pacemaker     Anxiety Benign hypertension     Calculus of kidney     Chronic coronary artery disease     History of atrial fibrillation     Hypercholesterolemia     High potassium     Polypharmacy     Hemorrhoids     Pre-diabetes     Hypothyroidism     Morbidly obese (HCC)     History of dysplasia of vulva     Vulvar intraepithelial neoplasia (MARII) grade 3     Current mild episode of major depressive disorder without prior episode ([facility]     Supraventricular tachycardia ([facility]

## 2021-01-01 ENCOUNTER — TELEPHONE (OUTPATIENT)
Dept: PRIMARY CARE CLINIC | Age: 76
End: 2021-01-01

## 2021-01-01 ENCOUNTER — OFFICE VISIT (OUTPATIENT)
Dept: FAMILY MEDICINE CLINIC | Age: 76
End: 2021-01-01
Payer: MEDICARE

## 2021-01-01 ENCOUNTER — TELEPHONE (OUTPATIENT)
Dept: FAMILY MEDICINE CLINIC | Age: 76
End: 2021-01-01

## 2021-01-01 ENCOUNTER — OFFICE VISIT (OUTPATIENT)
Dept: PRIMARY CARE CLINIC | Age: 76
End: 2021-01-01
Payer: MEDICARE

## 2021-01-01 ENCOUNTER — NURSE ONLY (OUTPATIENT)
Dept: FAMILY MEDICINE CLINIC | Age: 76
End: 2021-01-01
Payer: MEDICARE

## 2021-01-01 ENCOUNTER — HOSPITAL ENCOUNTER (OUTPATIENT)
Age: 76
Setting detail: SPECIMEN
Discharge: HOME | End: 2021-08-17
Payer: MEDICARE

## 2021-01-01 ENCOUNTER — HOSPITAL ENCOUNTER (OUTPATIENT)
Age: 76
Setting detail: SPECIMEN
Discharge: HOME | End: 2021-09-21
Payer: MEDICARE

## 2021-01-01 VITALS
TEMPERATURE: 98 F | BODY MASS INDEX: 33.69 KG/M2 | WEIGHT: 190.2 LBS | SYSTOLIC BLOOD PRESSURE: 82 MMHG | OXYGEN SATURATION: 99 % | HEART RATE: 93 BPM

## 2021-01-01 VITALS — DIASTOLIC BLOOD PRESSURE: 64 MMHG | HEART RATE: 105 BPM | SYSTOLIC BLOOD PRESSURE: 104 MMHG

## 2021-01-01 VITALS
HEART RATE: 69 BPM | TEMPERATURE: 96.4 F | DIASTOLIC BLOOD PRESSURE: 74 MMHG | BODY MASS INDEX: 35.78 KG/M2 | OXYGEN SATURATION: 96 % | RESPIRATION RATE: 16 BRPM | SYSTOLIC BLOOD PRESSURE: 112 MMHG | WEIGHT: 202 LBS

## 2021-01-01 VITALS
WEIGHT: 165 LBS | TEMPERATURE: 98.2 F | BODY MASS INDEX: 29.23 KG/M2 | SYSTOLIC BLOOD PRESSURE: 96 MMHG | OXYGEN SATURATION: 97 % | DIASTOLIC BLOOD PRESSURE: 76 MMHG | HEART RATE: 131 BPM

## 2021-01-01 VITALS
HEART RATE: 112 BPM | BODY MASS INDEX: 30.26 KG/M2 | RESPIRATION RATE: 18 BRPM | WEIGHT: 170.8 LBS | TEMPERATURE: 97.3 F | SYSTOLIC BLOOD PRESSURE: 94 MMHG | DIASTOLIC BLOOD PRESSURE: 60 MMHG | OXYGEN SATURATION: 98 %

## 2021-01-01 VITALS — DIASTOLIC BLOOD PRESSURE: 64 MMHG | HEART RATE: 130 BPM | SYSTOLIC BLOOD PRESSURE: 102 MMHG | OXYGEN SATURATION: 98 %

## 2021-01-01 DIAGNOSIS — F33.1 MODERATE EPISODE OF RECURRENT MAJOR DEPRESSIVE DISORDER (HCC): ICD-10-CM

## 2021-01-01 DIAGNOSIS — F41.9 ANXIETY: ICD-10-CM

## 2021-01-01 DIAGNOSIS — R53.83 FATIGUE, UNSPECIFIED TYPE: Primary | ICD-10-CM

## 2021-01-01 DIAGNOSIS — R30.0 DYSURIA: ICD-10-CM

## 2021-01-01 DIAGNOSIS — E78.5 DYSLIPIDEMIA: ICD-10-CM

## 2021-01-01 DIAGNOSIS — I48.0 PAROXYSMAL ATRIAL FIBRILLATION (HCC): ICD-10-CM

## 2021-01-01 DIAGNOSIS — R79.89 ABNORMAL CBC MEASUREMENT: ICD-10-CM

## 2021-01-01 DIAGNOSIS — I25.10 CHRONIC CORONARY ARTERY DISEASE: ICD-10-CM

## 2021-01-01 DIAGNOSIS — E03.9 HYPOTHYROIDISM, UNSPECIFIED TYPE: ICD-10-CM

## 2021-01-01 DIAGNOSIS — G47.09 OTHER INSOMNIA: ICD-10-CM

## 2021-01-01 DIAGNOSIS — E11.9 TYPE 2 DIABETES MELLITUS WITHOUT COMPLICATION, WITHOUT LONG-TERM CURRENT USE OF INSULIN (HCC): ICD-10-CM

## 2021-01-01 DIAGNOSIS — R35.0 URINARY FREQUENCY: ICD-10-CM

## 2021-01-01 DIAGNOSIS — J90 LARGE PLEURAL EFFUSION: Primary | ICD-10-CM

## 2021-01-01 DIAGNOSIS — I10 BENIGN HYPERTENSION: ICD-10-CM

## 2021-01-01 DIAGNOSIS — I47.1 SUPRAVENTRICULAR TACHYCARDIA (HCC): ICD-10-CM

## 2021-01-01 DIAGNOSIS — I95.9 HYPOTENSION, UNSPECIFIED HYPOTENSION TYPE: ICD-10-CM

## 2021-01-01 DIAGNOSIS — F32.A ANXIETY AND DEPRESSION: ICD-10-CM

## 2021-01-01 DIAGNOSIS — R53.81 MALAISE AND FATIGUE: ICD-10-CM

## 2021-01-01 DIAGNOSIS — I10 BENIGN HYPERTENSION: Primary | ICD-10-CM

## 2021-01-01 DIAGNOSIS — Z95.810 CARDIAC DEFIBRILLATOR IN PLACE: ICD-10-CM

## 2021-01-01 DIAGNOSIS — R73.03 PRE-DIABETES: ICD-10-CM

## 2021-01-01 DIAGNOSIS — E53.8 B12 DEFICIENCY: Primary | ICD-10-CM

## 2021-01-01 DIAGNOSIS — I95.9 HYPOTENSION, UNSPECIFIED HYPOTENSION TYPE: Primary | ICD-10-CM

## 2021-01-01 DIAGNOSIS — I42.9 CARDIOMYOPATHY, UNSPECIFIED TYPE (HCC): ICD-10-CM

## 2021-01-01 DIAGNOSIS — E03.9 HYPOTHYROIDISM, UNSPECIFIED TYPE: Primary | ICD-10-CM

## 2021-01-01 DIAGNOSIS — E53.8 VITAMIN B 12 DEFICIENCY: ICD-10-CM

## 2021-01-01 DIAGNOSIS — F41.9 ANXIETY AND DEPRESSION: ICD-10-CM

## 2021-01-01 DIAGNOSIS — F32.0 CURRENT MILD EPISODE OF MAJOR DEPRESSIVE DISORDER WITHOUT PRIOR EPISODE (HCC): ICD-10-CM

## 2021-01-01 DIAGNOSIS — I48.0 PAROXYSMAL ATRIAL FIBRILLATION (HCC): Primary | ICD-10-CM

## 2021-01-01 DIAGNOSIS — R63.4 WEIGHT LOSS: ICD-10-CM

## 2021-01-01 DIAGNOSIS — E61.1 IRON DEFICIENCY: Primary | ICD-10-CM

## 2021-01-01 DIAGNOSIS — R53.83 MALAISE AND FATIGUE: ICD-10-CM

## 2021-01-01 DIAGNOSIS — K21.9 GASTROESOPHAGEAL REFLUX DISEASE, UNSPECIFIED WHETHER ESOPHAGITIS PRESENT: Primary | ICD-10-CM

## 2021-01-01 DIAGNOSIS — N30.00 ACUTE CYSTITIS WITHOUT HEMATURIA: ICD-10-CM

## 2021-01-01 DIAGNOSIS — K21.9 GASTROESOPHAGEAL REFLUX DISEASE, UNSPECIFIED WHETHER ESOPHAGITIS PRESENT: ICD-10-CM

## 2021-01-01 DIAGNOSIS — Z95.0 PRESENCE OF CARDIAC PACEMAKER: ICD-10-CM

## 2021-01-01 LAB
ALBUMIN SERPL-MCNC: 3.7 G/DL
ALBUMIN SERPL-MCNC: 4 G/DL
ALBUMIN SERPL-MCNC: ABNORMAL G/DL
ALP BLD-CCNC: 47 U/L
ALP BLD-CCNC: 63 U/L
ALP BLD-CCNC: ABNORMAL U/L
ALT SERPL-CCNC: 12 U/L
ALT SERPL-CCNC: 30 U/L
ALT SERPL-CCNC: ABNORMAL U/L
ANION GAP SERPL CALCULATED.3IONS-SCNC: 11 MMOL/L
AST SERPL-CCNC: 13 U/L
AST SERPL-CCNC: 20 U/L
AST SERPL-CCNC: ABNORMAL U/L
AVERAGE GLUCOSE: 131
AVERAGE GLUCOSE: 137
B-TYPE NATRIURETIC PEPTIDE: 546 PG/ML
BASOPHILS ABSOLUTE: 0 /ΜL
BASOPHILS ABSOLUTE: NORMAL
BASOPHILS RELATIVE PERCENT: 0.3 %
BASOPHILS RELATIVE PERCENT: NORMAL
BILIRUB SERPL-MCNC: 0.7 MG/DL (ref 0.1–1.4)
BILIRUB SERPL-MCNC: 0.7 MG/DL (ref 0.1–1.4)
BILIRUB SERPL-MCNC: ABNORMAL MG/DL
BILIRUBIN URINE: 0 MG/DL
BILIRUBIN URINE: NEGATIVE
BILIRUBIN, POC: ABNORMAL
BLOOD URINE, POC: ABNORMAL
BLOOD, URINE: NEGATIVE
BUN BLDV-MCNC: 13 MG/DL
BUN BLDV-MCNC: 15 MG/DL
BUN BLDV-MCNC: 19 MG/DL
CALCIUM SERPL-MCNC: 8.7 MG/DL
CALCIUM SERPL-MCNC: 9 MG/DL
CALCIUM SERPL-MCNC: 9.2 MG/DL
CHLORIDE BLD-SCNC: 101 MMOL/L
CHLORIDE BLD-SCNC: 99 MMOL/L
CHLORIDE BLD-SCNC: 99 MMOL/L
CHOLESTEROL, TOTAL: 101 MG/DL
CHOLESTEROL, TOTAL: 127 MG/DL
CHOLESTEROL/HDL RATIO: 2.5
CHOLESTEROL/HDL RATIO: 2.8
CLARITY, POC: CLEAR
CLARITY: CLEAR
CO2: 27 MMOL/L
CO2: 31 MMOL/L
CO2: 32 MMOL/L
COLOR, POC: YELLOW
COLOR: YELLOW
COLOR: YELLOW
COMMENT UA: NORMAL
CREAT SERPL-MCNC: 0.68 MG/DL
CREAT SERPL-MCNC: 0.83 MG/DL
CREAT SERPL-MCNC: 1.17 MG/DL
CULTURE: NORMAL
EOSINOPHILS ABSOLUTE: 0 /ΜL
EOSINOPHILS ABSOLUTE: NORMAL
EOSINOPHILS RELATIVE PERCENT: 0.4 %
EOSINOPHILS RELATIVE PERCENT: NORMAL
FERRITIN: 44 NG/ML (ref 9–150)
FERRITIN: 71 NG/ML (ref 9–150)
FOLATE: 12.1
FOLATE: 16.5
GFR CALCULATED: 81
GLUCOSE BLD-MCNC: 140 MG/DL
GLUCOSE FASTING: 111 MG/DL
GLUCOSE FASTING: 93 MG/DL
GLUCOSE URINE, POC: ABNORMAL
GLUCOSE URINE: NEGATIVE
GLUCOSE URINE: NEGATIVE
HBA1C MFR BLD: 6.2 %
HBA1C MFR BLD: 6.4 %
HCT VFR BLD CALC: 40.9 % (ref 36–46)
HCT VFR BLD CALC: 42.9 % (ref 36–46)
HDLC SERPL-MCNC: 41 MG/DL (ref 35–70)
HDLC SERPL-MCNC: 45 MG/DL (ref 35–70)
HEMOGLOBIN: 13.4 G/DL (ref 12–16)
HEMOGLOBIN: 14.3 G/DL (ref 12–16)
INR BLD: 1.2
IRON: 52
IRON: 85
KETONES, POC: ABNORMAL
KETONES, URINE: NEGATIVE
KETONES, URINE: NEGATIVE
LDL CHOLESTEROL CALCULATED: 42 MG/DL (ref 0–160)
LDL CHOLESTEROL CALCULATED: 56 MG/DL (ref 0–160)
LEUKOCYTE EST, POC: ABNORMAL
LEUKOCYTE ESTERASE, URINE: NEGATIVE
LEUKOCYTE ESTERASE, URINE: NEGATIVE
LYMPHOCYTES ABSOLUTE: 1.7 /ΜL
LYMPHOCYTES ABSOLUTE: NORMAL
LYMPHOCYTES RELATIVE PERCENT: 16.6 %
LYMPHOCYTES RELATIVE PERCENT: NORMAL
Lab: NORMAL
MCH RBC QN AUTO: 30.4 PG
MCH RBC QN AUTO: 30.8 PG
MCHC RBC AUTO-ENTMCNC: 32.8 G/DL
MCHC RBC AUTO-ENTMCNC: 33.3 G/DL
MCV RBC AUTO: 91 FL
MCV RBC AUTO: 94 FL
MONOCYTES ABSOLUTE: 1 /ΜL
MONOCYTES ABSOLUTE: NORMAL
MONOCYTES RELATIVE PERCENT: 9.2 %
MONOCYTES RELATIVE PERCENT: NORMAL
NEUTROPHILS ABSOLUTE: 7.7 /ΜL
NEUTROPHILS ABSOLUTE: NORMAL
NEUTROPHILS RELATIVE PERCENT: 73.5 %
NEUTROPHILS RELATIVE PERCENT: NORMAL
NITRITE, POC: ABNORMAL
NITRITE, URINE: NEGATIVE
NITRITE, URINE: NEGATIVE
NONHDLC SERPL-MCNC: ABNORMAL MG/DL
NONHDLC SERPL-MCNC: ABNORMAL MG/DL
PDW BLD-RTO: 18.1 %
PH UA: 5.5 (ref 4.5–8)
PH UA: 6.5 (ref 5–8)
PH, POC: 6.5
PHOSPHORUS: 3.4 MG/DL
PLATELET # BLD: 220 K/ΜL
PLATELET # BLD: 221 K/ΜL
PMV BLD AUTO: 10.3 FL
PMV BLD AUTO: 9.6 FL
POTASSIUM SERPL-SCNC: 3.9 MMOL/L
POTASSIUM SERPL-SCNC: 3.9 MMOL/L
POTASSIUM SERPL-SCNC: 4.8 MMOL/L
PROTEIN UA: NEGATIVE
PROTEIN UA: NEGATIVE
PROTEIN, POC: ABNORMAL
PROTIME: 14.1 SECONDS
PTH INTACT: 46
RBC # BLD: 4.35 10^6/ΜL
RBC # BLD: 4.7 10^6/ΜL
RETIC: 1
RETIC: 1.2
SEDIMENTATION RATE, ERYTHROCYTE: 16
SODIUM BLD-SCNC: 137 MMOL/L
SODIUM BLD-SCNC: 141 MMOL/L
SODIUM BLD-SCNC: 142 MMOL/L
SPECIFIC GRAVITY UA: 1.01 (ref 1–1.03)
SPECIFIC GRAVITY UA: 1.01 (ref 1–1.03)
SPECIFIC GRAVITY, POC: 1.01
SPECIMEN DESCRIPTION: NORMAL
TOTAL IRON BINDING CAPACITY: 381
TOTAL IRON BINDING CAPACITY: 444
TOTAL PROTEIN: 6.6 G/DL (ref 6.4–8.2)
TOTAL PROTEIN: 7.2 G/DL (ref 6.4–8.2)
TOTAL PROTEIN: ABNORMAL
TRIGL SERPL-MCNC: 128 MG/DL
TRIGL SERPL-MCNC: 92 MG/DL
TSH SERPL DL<=0.05 MIU/L-ACNC: 0.2 UIU/ML
TSH SERPL DL<=0.05 MIU/L-ACNC: 0.77 UIU/ML
TURBIDITY: CLEAR
URINE HGB: NEGATIVE
UROBILINOGEN, POC: 0.2
UROBILINOGEN, URINE: NORMAL
UROBILINOGEN, URINE: NORMAL
VITAMIN B-12: 142
VITAMIN B-12: 207
VITAMIN D 25-HYDROXY: 35.9
VITAMIN D 25-HYDROXY: 54.1
VITAMIN D2, 25 HYDROXY: NORMAL
VITAMIN D2, 25 HYDROXY: NORMAL
VITAMIN D3,25 HYDROXY: NORMAL
VITAMIN D3,25 HYDROXY: NORMAL
VLDLC SERPL CALC-MCNC: 18 MG/DL
VLDLC SERPL CALC-MCNC: 26 MG/DL
WBC # BLD: 10.5 10^3/ML
WBC # BLD: 7.9 10^3/ML

## 2021-01-01 PROCEDURE — G8417 CALC BMI ABV UP PARAM F/U: HCPCS | Performed by: FAMILY MEDICINE

## 2021-01-01 PROCEDURE — 99214 OFFICE O/P EST MOD 30 MIN: CPT | Performed by: FAMILY MEDICINE

## 2021-01-01 PROCEDURE — 4040F PNEUMOC VAC/ADMIN/RCVD: CPT | Performed by: FAMILY MEDICINE

## 2021-01-01 PROCEDURE — G8427 DOCREV CUR MEDS BY ELIG CLIN: HCPCS | Performed by: FAMILY MEDICINE

## 2021-01-01 PROCEDURE — 1111F DSCHRG MED/CURRENT MED MERGE: CPT | Performed by: NURSE PRACTITIONER

## 2021-01-01 PROCEDURE — G8427 DOCREV CUR MEDS BY ELIG CLIN: HCPCS | Performed by: NURSE PRACTITIONER

## 2021-01-01 PROCEDURE — G8417 CALC BMI ABV UP PARAM F/U: HCPCS | Performed by: NURSE PRACTITIONER

## 2021-01-01 PROCEDURE — 1090F PRES/ABSN URINE INCON ASSESS: CPT | Performed by: NURSE PRACTITIONER

## 2021-01-01 PROCEDURE — 1090F PRES/ABSN URINE INCON ASSESS: CPT | Performed by: FAMILY MEDICINE

## 2021-01-01 PROCEDURE — 1123F ACP DISCUSS/DSCN MKR DOCD: CPT | Performed by: FAMILY MEDICINE

## 2021-01-01 PROCEDURE — G8484 FLU IMMUNIZE NO ADMIN: HCPCS | Performed by: NURSE PRACTITIONER

## 2021-01-01 PROCEDURE — 4040F PNEUMOC VAC/ADMIN/RCVD: CPT | Performed by: NURSE PRACTITIONER

## 2021-01-01 PROCEDURE — 99496 TRANSJ CARE MGMT HIGH F2F 7D: CPT | Performed by: NURSE PRACTITIONER

## 2021-01-01 PROCEDURE — 1036F TOBACCO NON-USER: CPT | Performed by: NURSE PRACTITIONER

## 2021-01-01 PROCEDURE — G8400 PT W/DXA NO RESULTS DOC: HCPCS | Performed by: FAMILY MEDICINE

## 2021-01-01 PROCEDURE — 81003 URINALYSIS AUTO W/O SCOPE: CPT | Performed by: FAMILY MEDICINE

## 2021-01-01 PROCEDURE — 99214 OFFICE O/P EST MOD 30 MIN: CPT | Performed by: NURSE PRACTITIONER

## 2021-01-01 PROCEDURE — 96372 THER/PROPH/DIAG INJ SC/IM: CPT | Performed by: NURSE PRACTITIONER

## 2021-01-01 PROCEDURE — 1036F TOBACCO NON-USER: CPT | Performed by: FAMILY MEDICINE

## 2021-01-01 PROCEDURE — G8400 PT W/DXA NO RESULTS DOC: HCPCS | Performed by: NURSE PRACTITIONER

## 2021-01-01 PROCEDURE — 1123F ACP DISCUSS/DSCN MKR DOCD: CPT | Performed by: NURSE PRACTITIONER

## 2021-01-01 PROCEDURE — 99211 OFF/OP EST MAY X REQ PHY/QHP: CPT | Performed by: NURSE PRACTITIONER

## 2021-01-01 RX ORDER — CYANOCOBALAMIN 1000 UG/ML
1000 INJECTION INTRAMUSCULAR; SUBCUTANEOUS ONCE
Status: COMPLETED | OUTPATIENT
Start: 2021-01-01 | End: 2021-01-01

## 2021-01-01 RX ORDER — WARFARIN SODIUM 2 MG/1
2 TABLET ORAL DAILY
Qty: 30 TABLET | Refills: 2 | Status: CANCELLED | OUTPATIENT
Start: 2021-01-01 | End: 2022-03-06

## 2021-01-01 RX ORDER — WARFARIN SODIUM 5 MG/1
TABLET ORAL
COMMUNITY
End: 2021-01-01 | Stop reason: ALTCHOICE

## 2021-01-01 RX ORDER — AMIODARONE HYDROCHLORIDE 200 MG/1
200 TABLET ORAL DAILY
Qty: 30 TABLET | Refills: 2 | Status: SHIPPED | OUTPATIENT
Start: 2021-01-01 | End: 2022-02-09

## 2021-01-01 RX ORDER — METOPROLOL SUCCINATE 50 MG/1
25 TABLET, EXTENDED RELEASE ORAL DAILY
Qty: 30 TABLET | Refills: 2 | Status: SHIPPED
Start: 2021-01-01 | End: 2021-01-01

## 2021-01-01 RX ORDER — PREDNISONE 20 MG/1
TABLET ORAL
COMMUNITY
Start: 2021-01-01 | End: 2021-01-01 | Stop reason: ALTCHOICE

## 2021-01-01 RX ORDER — FERROUS SULFATE 325(65) MG
325 TABLET ORAL
Qty: 30 TABLET | Refills: 1 | Status: SHIPPED
Start: 2021-01-01 | End: 2021-01-01

## 2021-01-01 RX ORDER — SERTRALINE HYDROCHLORIDE 25 MG/1
25 TABLET, FILM COATED ORAL DAILY
Qty: 30 TABLET | Refills: 3 | Status: SHIPPED
Start: 2021-01-01 | End: 2021-01-01

## 2021-01-01 RX ORDER — ALPRAZOLAM 0.25 MG/1
0.25 TABLET ORAL 2 TIMES DAILY PRN
Qty: 30 TABLET | Refills: 0 | Status: SHIPPED | OUTPATIENT
Start: 2021-01-01 | End: 2021-10-30

## 2021-01-01 RX ORDER — FUROSEMIDE 40 MG/1
40 TABLET ORAL DAILY
COMMUNITY
End: 2021-01-01 | Stop reason: ALTCHOICE

## 2021-01-01 RX ORDER — WARFARIN SODIUM 1 MG/1
1 TABLET ORAL DAILY
Qty: 60 TABLET | Refills: 2 | Status: SHIPPED | OUTPATIENT
Start: 2021-01-01

## 2021-01-01 RX ORDER — LOSARTAN POTASSIUM 25 MG/1
25 TABLET ORAL DAILY
Qty: 30 TABLET | Refills: 2 | Status: SHIPPED | OUTPATIENT
Start: 2021-01-01 | End: 2021-01-01 | Stop reason: ALTCHOICE

## 2021-01-01 RX ORDER — OMEPRAZOLE 20 MG/1
20 CAPSULE, DELAYED RELEASE ORAL DAILY
Qty: 90 CAPSULE | Refills: 2 | Status: SHIPPED
Start: 2021-01-01 | End: 2021-01-01 | Stop reason: ALTCHOICE

## 2021-01-01 RX ORDER — TRAZODONE HYDROCHLORIDE 150 MG/1
TABLET ORAL
Qty: 30 TABLET | Refills: 2 | Status: SHIPPED | OUTPATIENT
Start: 2021-01-01 | End: 2021-01-01 | Stop reason: SDUPTHER

## 2021-01-01 RX ORDER — WARFARIN SODIUM 2 MG/1
TABLET ORAL
COMMUNITY
End: 2021-01-01 | Stop reason: SDUPTHER

## 2021-01-01 RX ORDER — AMIODARONE HYDROCHLORIDE 200 MG/1
TABLET ORAL
COMMUNITY
End: 2021-01-01 | Stop reason: SDUPTHER

## 2021-01-01 RX ORDER — ROSUVASTATIN CALCIUM 10 MG/1
10 TABLET, COATED ORAL DAILY
Qty: 30 TABLET | Refills: 5
Start: 2021-01-01

## 2021-01-01 RX ORDER — TRAZODONE HYDROCHLORIDE 150 MG/1
150 TABLET ORAL NIGHTLY
Qty: 45 TABLET | Refills: 2 | Status: SHIPPED | OUTPATIENT
Start: 2021-01-01 | End: 2022-02-09

## 2021-01-01 RX ORDER — M-VIT,TX,IRON,MINS/CALC/FOLIC 27MG-0.4MG
1 TABLET ORAL DAILY
Qty: 30 TABLET | Refills: 2 | Status: SHIPPED | OUTPATIENT
Start: 2021-01-01 | End: 2022-02-09

## 2021-01-01 RX ORDER — METOPROLOL SUCCINATE 50 MG/1
50 TABLET, EXTENDED RELEASE ORAL DAILY
Qty: 30 TABLET | Refills: 2 | Status: SHIPPED | OUTPATIENT
Start: 2021-01-01 | End: 2021-01-01 | Stop reason: DRUGHIGH

## 2021-01-01 RX ORDER — ALPRAZOLAM 1 MG/1
1 TABLET ORAL 2 TIMES DAILY
COMMUNITY
End: 2021-01-01 | Stop reason: DRUGHIGH

## 2021-01-01 RX ORDER — LEVOTHYROXINE SODIUM 0.05 MG/1
50 TABLET ORAL DAILY
Qty: 30 TABLET | Refills: 2 | Status: SHIPPED | OUTPATIENT
Start: 2021-01-01 | End: 2022-02-09

## 2021-01-01 RX ORDER — ALPRAZOLAM 0.25 MG/1
0.25 TABLET ORAL 2 TIMES DAILY PRN
Qty: 30 TABLET | Refills: 0 | Status: SHIPPED | OUTPATIENT
Start: 2021-01-01 | End: 2021-01-01

## 2021-01-01 RX ORDER — ALPRAZOLAM 0.25 MG/1
0.25 TABLET ORAL 2 TIMES DAILY PRN
Qty: 30 TABLET | Refills: 0 | Status: SHIPPED | OUTPATIENT
Start: 2021-01-01 | End: 2021-01-01 | Stop reason: SDUPTHER

## 2021-01-01 RX ORDER — WARFARIN SODIUM 2 MG/1
2 TABLET ORAL DAILY
Qty: 30 TABLET | Refills: 2 | Status: SHIPPED
Start: 2021-01-01 | End: 2021-01-01 | Stop reason: DRUGHIGH

## 2021-01-01 RX ORDER — CARVEDILOL 25 MG/1
12.5 TABLET ORAL 2 TIMES DAILY
Qty: 60 TABLET | Refills: 0 | Status: SHIPPED
Start: 2021-01-01 | End: 2021-01-01 | Stop reason: ALTCHOICE

## 2021-01-01 RX ORDER — NITROFURANTOIN 25; 75 MG/1; MG/1
100 CAPSULE ORAL 2 TIMES DAILY
Qty: 14 CAPSULE | Refills: 0 | Status: SHIPPED | OUTPATIENT
Start: 2021-01-01 | End: 2021-01-01

## 2021-01-01 RX ORDER — BUMETANIDE 2 MG/1
2 TABLET ORAL DAILY
Qty: 30 TABLET | Refills: 2 | Status: SHIPPED | OUTPATIENT
Start: 2021-01-01 | End: 2021-01-01

## 2021-01-01 RX ORDER — PANTOPRAZOLE SODIUM 40 MG/1
40 TABLET, DELAYED RELEASE ORAL
Qty: 60 TABLET | Refills: 2 | Status: SHIPPED | OUTPATIENT
Start: 2021-01-01 | End: 2022-03-31

## 2021-01-01 RX ORDER — BUSPIRONE HYDROCHLORIDE 5 MG/1
5 TABLET ORAL 3 TIMES DAILY PRN
Qty: 90 TABLET | Refills: 0 | Status: SHIPPED | OUTPATIENT
Start: 2021-01-01 | End: 2021-01-01

## 2021-01-01 RX ORDER — SPIRONOLACTONE 25 MG/1
25 TABLET ORAL 2 TIMES DAILY
Qty: 60 TABLET | Refills: 2 | Status: SHIPPED | OUTPATIENT
Start: 2021-01-01 | End: 2021-01-01 | Stop reason: ALTCHOICE

## 2021-01-01 RX ORDER — OMEPRAZOLE 20 MG/1
CAPSULE, DELAYED RELEASE ORAL PRN
COMMUNITY
Start: 2021-01-01 | End: 2021-01-01 | Stop reason: ALTCHOICE

## 2021-01-01 RX ORDER — M-VIT,TX,IRON,MINS/CALC/FOLIC 27MG-0.4MG
1 TABLET ORAL DAILY
COMMUNITY
End: 2021-01-01 | Stop reason: SDUPTHER

## 2021-01-01 RX ADMIN — CYANOCOBALAMIN 1000 MCG: 1000 INJECTION INTRAMUSCULAR; SUBCUTANEOUS at 09:52

## 2021-01-01 ASSESSMENT — ENCOUNTER SYMPTOMS
SHORTNESS OF BREATH: 1
CONSTIPATION: 0
SORE THROAT: 0
SINUS PRESSURE: 0
SORE THROAT: 0
TROUBLE SWALLOWING: 0
CHEST TIGHTNESS: 0
NAUSEA: 0
CHEST TIGHTNESS: 0
RHINORRHEA: 0
SINUS PRESSURE: 0
COUGH: 1
COUGH: 1
BACK PAIN: 1
ABDOMINAL DISTENTION: 1
SHORTNESS OF BREATH: 1
RHINORRHEA: 0
DIARRHEA: 0
CONSTIPATION: 1
BLOOD IN STOOL: 0
ABDOMINAL PAIN: 0
ABDOMINAL PAIN: 0
DIARRHEA: 0
BLOOD IN STOOL: 0
WHEEZING: 0
NAUSEA: 0
TROUBLE SWALLOWING: 0
BACK PAIN: 0
WHEEZING: 0

## 2021-01-01 ASSESSMENT — VISUAL ACUITY
OU: 1
OU: 1

## 2021-01-01 ASSESSMENT — PATIENT HEALTH QUESTIONNAIRE - PHQ9: SUM OF ALL RESPONSES TO PHQ QUESTIONS 1-9: 2

## 2021-01-11 PROBLEM — Z79.82 LONG TERM (CURRENT) USE OF ASPIRIN: Status: ACTIVE | Noted: 2017-12-15

## 2021-01-11 PROBLEM — E11.9 TYPE 2 DIABETES MELLITUS WITHOUT COMPLICATIONS (HCC): Status: ACTIVE | Noted: 2017-12-15

## 2021-01-11 PROBLEM — Z79.84 LONG TERM (CURRENT) USE OF ORAL HYPOGLYCEMIC DRUGS: Status: ACTIVE | Noted: 2017-12-15

## 2021-01-11 PROBLEM — I48.0 PAROXYSMAL ATRIAL FIBRILLATION (HCC): Status: ACTIVE | Noted: 2017-12-15

## 2021-01-11 PROBLEM — Z87.891 PERSONAL HISTORY OF NICOTINE DEPENDENCE: Status: ACTIVE | Noted: 2017-12-15
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
## 2021-01-14 PROBLEM — E66.01 MORBIDLY OBESE (HCC): Status: RESOLVED | Noted: 2018-04-20 | Resolved: 2021-01-01

## 2021-01-14 NOTE — ASSESSMENT & PLAN NOTE
Stable, medicated, monitored.  Follows with Dr. Kaylyn Lala [facility])

## 2021-05-24 NOTE — TELEPHONE ENCOUNTER
Hypothyroidism     History of dysplasia of vulva     Vulvar intraepithelial neoplasia (MARII) grade 3     Current mild episode of major depressive disorder without prior episode (HCC)     Supraventricular tachycardia ([address]     Long term (current) use of aspirin     Long term (current) use of oral hypoglycemic drugs     Personal history of nicotine dependence     Type 2 diabetes mellitus without complications (HCC)     Paroxysmal atrial fibrillation (HCC)     Conduction disorder of the heart     Paroxysmal ventricular tachycardia ([address]

## 2021-07-06 NOTE — TELEPHONE ENCOUNTER
Patient complaining of urgency , blood in urine, pressure with urination. Patient advised to come to the Walk In. She will attempt to come in.   But she is requesting an atb to be sent to the pharmacy as she has had UTI's in the past.  Rx: Vicky Caballero

## 2021-07-06 NOTE — PROGRESS NOTES
Patient: Conchita Ohoward    Procedure(s):  LYSIS, ADHESIONS, LAPAROSCOPIC    Diagnosis:Bowel obstruction (H) [K56.609]  Diagnosis Additional Information: No value filed.    Anesthesia Type:  General, RSI, ETT    Note:  Anesthesia Post Evaluation    Patient location during evaluation: PACU  Patient participation: Able to fully participate in evaluation  Level of consciousness: responsive to verbal stimuli  Pain management: adequate  Airway patency: patent  Cardiovascular status: acceptable  Respiratory status: acceptable  Hydration status: acceptable  PONV: none     Anesthetic complications: None          Last vitals:  Vitals:    02/21/20 0053 02/21/20 0100 02/21/20 0110   BP: 137/85 (!) 138/92 (!) 140/92   Pulse: 96 85    Resp:  8 20   Temp: 37.6  C (99.7  F)     SpO2: 100% 100% 100%         Electronically Signed By: Douglas Arreguin MD  February 21, 2020  1:20 AM   Subjective:  Calista De La Cruz presents for   Chief Complaint   Patient presents with    Urinary Tract Infection   does not check her bp at home. Does not get uti's routinely    No vag discharge. No fevers or chills    Has some pressure, but no dysuria. in past 24 hours has drank only about 28 ounces in the past 24 hours. Not eating much for  A while    Unintentional weight loss    Feels tired and weak. Not sob. No cp. No diaprhoresis. Last bm - small this am. Not having any blood or melena in stool. She does see blood in her urine. Has a pacer and defib      Patient Active Problem List   Diagnosis    Multinodular goiter    Dyslipidemia    Osteopenia    Cardiomyopathy ([facility]    FH: CAD (coronary artery disease)    Family history of cerebrovascular accident    Insomnia    Cardiac defibrillator in place    Presence of cardiac pacemaker    Anxiety    Benign hypertension    Calculus of kidney    Chronic coronary artery disease    History of atrial fibrillation    Hypercholesterolemia    High potassium    Polypharmacy    Hemorrhoids    Pre-diabetes    Hypothyroidism    History of dysplasia of vulva    Vulvar intraepithelial neoplasia (MARII) grade 3    Current mild episode of major depressive disorder without prior episode (HCC)    Supraventricular tachycardia ([facility] [facility]    Long term (current) use of aspirin    Long term (current) use of oral hypoglycemic drugs    Personal history of nicotine dependence    Type 2 diabetes mellitus without complications (HCC)    Paroxysmal atrial fibrillation (HCC)    Conduction disorder of the heart    Paroxysmal ventricular tachycardia (HCC)         Review of Systems:  ·   ·   · Cardiovascular: no pain, WILKINS, orthopnea, palpitations or claudication  · Gastrointestinal: no chronic nausea, vomiting, heartburn, diarrhea, constipation, bloating, or abdominal pain. No bloody or black stools.     Objective:  Physical Exam   Vitals:   Vitals:    07/06/21 0913   BP: (!) 98/52   Site: Left Upper Arm   Position: Sitting   Cuff Size: Medium Adult   Pulse: 93   Temp: 98 °F (36.7 °C)   SpO2: 99%   Weight: 190 lb 3.2 oz (86.3 kg)     Vitals:    07/06/21 0913 07/06/21 0943   BP: (!) 98/52 (!) 82/0   Site: Left Upper Arm Left Upper Arm   Position: Sitting Sitting   Cuff Size: Medium Adult Large Adult   Pulse: 93    Temp: 98 °F (36.7 °C)    SpO2: 99%    Weight: 190 lb 3.2 oz (86.3 kg)      I could not hear the diastolic on the 2nd check. Wt Readings from Last 3 Encounters:   07/06/21 190 lb 3.2 oz (86.3 kg)   01/11/21 202 lb (91.6 kg)   06/26/20 202 lb (91.6 kg)     Ht Readings from Last 3 Encounters:   06/26/20 5' 3\" (1.6 m)   12/05/19 5' 2\" (1.575 m)   08/16/19 5' 2.5\" (1.588 m)     Body mass index is 33.69 kg/m². Constitutional: She is oriented to person, place, and time. She appears well-developed and well-nourished and in no acute distress. Answers all my questions appropriately. Head: Normocephalic and atraumatic. Eyes:conjunctiva appear normal.  Nose: pink, non-edematous mucosa. No polyps. No septal deviation  Throat: no erythema, tonsillar hypertrophy or exudate. No ulcerations noted. Mucus membranes appear dry. Slightly pale in appearance  Neck: Normal range of motion. Neck supple. No tracheal deviation present. No abnormal lymphadenopathy. No JVD noted. Carotids are clear bilaterally. No thyroid masses noted. Heart: irreg irreg without murmur. Rate is reasonable. No S3, S4, or gallop noted. Chest: Clear to auscultation bilaterally. Good breath sounds noted. No rales, wheezes, or rhonchi noted. No respiratory retractions noted. Wall has symmetrical movement with respirations. Abdomen: No distension noted.  + bowel sounds in all quadrants which are normoactive. No bruits noted. No masses could be palpated. No unusual pulsatile masses noted. To deep palpation, patient denied any significant pain.  But she does have vauge discomfort in upper belly rlq and suprapubically. No rebound, guarding or rigidity noted to my exam.          Assessment:   Encounter Diagnoses   Name Primary?  Hypotension, unspecified hypotension type Yes    Weight loss     Urinary frequency          Plan:   Medications Discontinued During This Encounter   Medication Reason    aspirin 81 MG tablet LIST CLEANUP     THE ABOVE NOTED DISCONTINUED MEDS MAY ONLY BE FROM 'CLEANING UP' THE MED LIST AND WERE NOT ACTUALLY CANCELED;  SEE CHART FOR DETAILS! No orders of the defined types were placed in this encounter. No orders of the defined types were placed in this encounter. No follow-ups on file. There are no Patient Instructions on file for this visit. Data Unavailable      Due to the patients low bp advised she needed an immediate eval in the ER, I expect they will do further eval, labs and iv fluids. She declined transport via ambualnce.  states he will drive her. We wheeled her out to her car. Advised  that she should not stand and should use a wheelchair to get her into the ER. [facility] was called by the [facility]

## 2021-07-27 NOTE — TELEPHONE ENCOUNTER
Last visit: 1/11/21  Last Med refill: 12/31/21    Next Visit Date:  No future appointments.     Health Maintenance   Topic Date Due    COVID-19 Vaccine (1) Never done    Shingles Vaccine (1 of 2) Never done   [facility] Annual Well[facility] (AWV)  Never done    DTaP/Tdap/Td vaccine (2 - Tdap) 07/10/2019    Flu vaccine (1) 09/01/2021    Lipid screen  01/14/2022    TSH testing  01/14/2022    Potassium monitoring  02/18/2022    Creatinine monitoring  02/18/2022    Pneumococcal 65+ years Vaccine  Completed    Hepatitis C screen  Completed    DEXA (modify frequency per FRAX score)  Addressed    Hepatitis A vaccine  Aged Out    Hib vaccine  Aged Out    Meningococcal (ACWY) vaccine  Aged Out       Hemoglobin A1C (%)   Date Value   01/14/2021 6.2   07/15/2020 6.3   01/15/2020 6.1             ( goal A1C is < 7)   No results found for: LABMICR  LDL Calculated (mg/dL)   Date Value   01/14/2021 56   07/15/2020 43       (goal LDL is <100)   AST (U/L)   Date Value   01/14/2021 13     ALT (U/L)   Date Value   01/14/2021 12     BUN (mg/dL)   Date Value   02/18/2021 19     BP Readings from Last 3 Encounters:   07/06/21 (!) 82/0   01/11/21 112/74   06/26/20 120/75          (goal 120/80)    All Future Testing planned in [facility]  Lab Frequency Next Occurrence               Patient Active Problem List:     Multinodular goiter     Dyslipidemia     Osteopenia     Cardiomyopathy (HCC)     FH: CAD (coronary artery disease)     Family history of cerebrovascular accident     Insomnia     Cardiac defibrillator in place     Presence of cardiac pacemaker     Anxiety     Benign hypertension     Calculus of kidney     Chronic coronary artery disease     History of atrial fibrillation     Hypercholesterolemia     High potassium     Polypharmacy     Hemorrhoids     Pre-diabetes     Hypothyroidism     History of dysplasia of vulva     Vulvar intraepithelial neoplasia (MARII) grade 3     Current mild episode of major depressive disorder without prior episode ([address]     Supraventricular tachycardia ([address]     Long term (current) use of aspirin     Long term (current) use of oral hypoglycemic drugs     Personal history of nicotine dependence     Type 2 diabetes mellitus without complications (HCC)     Paroxysmal atrial fibrillation (HCC)     Conduction disorder of the heart     Paroxysmal ventricular tachycardia ([address]

## 2021-07-30 NOTE — TELEPHONE ENCOUNTER
Patient scheduled 8/09/21. Records requested from [facility] and Rehab facility. To discuss INR at appt.

## 2021-08-09 NOTE — PROGRESS NOTES
Post-Discharge Transitional Care Management Services or Hospital Follow Up      Juan Mead   YOB: 1945    Date of Office Visit:  8/9/2021  Date of Hospital Admission: 7/6/2021  Date of Hospital Discharge: 7/23/2021    Care management risk score Rising risk (score 2-5) and Complex Care (Scores >=6): 2     Non face to face  following discharge, date last encounter closed (first attempt may have been earlier): yes    Call initiated 2 business days of discharge: yes    Patient Active Problem List   Diagnosis    Multinodular goiter    Dyslipidemia    Osteopenia    Cardiomyopathy ([facility] [address]    FH: CAD (coronary artery disease)    Family history of cerebrovascular accident    Insomnia    Cardiac defibrillator in place    Presence of cardiac pacemaker    Anxiety    Benign hypertension    Calculus of kidney    Chronic coronary artery disease    History of atrial fibrillation    Hypercholesterolemia    High potassium    Polypharmacy    Hemorrhoids    Pre-diabetes    Hypothyroidism    History of dysplasia of vulva    Vulvar intraepithelial neoplasia (MARII) grade 3    Current mild episode of major depressive disorder without prior episode ([facility] [address]    Supraventricular tachycardia ([facility] [address]    Long term (current) use of aspirin    Long term (current) use of oral hypoglycemic drugs    Personal history of nicotine dependence    Type 2 diabetes mellitus without complications (HCC)    Paroxysmal atrial fibrillation (HCC)    Conduction disorder of the heart    Paroxysmal ventricular tachycardia (HCC)    Gastroesophageal reflux disease       No Known Allergies    Medications listed as ordered at the time of discharge from hospital  Patient's previous medication list had significant changes from [facility]. Fredo's discharge, and then additional changes with rehab. Patient states she is taking all her medications currently that she has taken prior to the other medicines.   Due to her blood pressure being low today I did tell her to hold Avapro. Patient is to come into the office Wednesday morning for a nurse visit for blood pressure check, and complete her fasting blood work. Medications marked \"taking\" at this time  Outpatient Medications Marked as Taking for the 8/9/21 encounter (Office Visit) with BRISSA Olivia - CNP   Medication Sig Dispense Refill    bumetanide (BUMEX) 2 MG tablet Take 1 tablet by mouth daily 30 tablet 2    ALPRAZolam (XANAX) 0.25 MG tablet Take 1 tablet by mouth 2 times daily as needed for Anxiety for up to 30 days. 30 tablet 0    amiodarone (CORDARONE) 200 MG tablet Take 1 tablet by mouth daily 30 tablet 2    levothyroxine (SYNTHROID) 50 MCG tablet Take 1 tablet by mouth Daily 30 tablet 2    losartan (COZAAR) 25 MG tablet Take 1 tablet by mouth daily 30 tablet 2    metoprolol succinate (TOPROL XL) 50 MG extended release tablet Take 1 tablet by mouth daily 30 tablet 2    omeprazole (PRILOSEC) 20 MG delayed release capsule Take 1 capsule by mouth Daily 90 capsule 2    Multiple Vitamins-Minerals (THERAPEUTIC MULTIVITAMIN-MINERALS) tablet Take 1 tablet by mouth daily 30 tablet 2    traZODone (DESYREL) 150 MG tablet Take 1 tablet by mouth nightly 45 tablet 2    warfarin (COUMADIN) 2 MG tablet Take 1 tablet by mouth daily Take per INR. May range from half tab to whole tab 30 tablet 2    spironolactone (ALDACTONE) 25 MG tablet Take 1 tablet by mouth 2 times daily 60 tablet 2    predniSONE (DELTASONE) 20 MG tablet Finishing today and tomorrow is last day. Medications patient taking as of now reconciled against medications ordered at time of hospital discharge: Yes    Chief Complaint   Patient presents with    Follow-Up from Hospital     [facility]itional [facility]     Medication Check       Is here today for a hospital follow-up. It is noted she was admitted to [facility] for approximately 3 weeks.   At that time she had significant changes to her medications. She was seen routinely by cardiology as well as nephrology. As noted patient also had a chest tube during her admission. She was then discharged to an extended care rehab facility. She was there for a short time and has only been home for a few days. Patient appears very anxious and agitated. She makes it very evident that she does not want to be here for this follow-up appointment today. We discussed with her changes to her medications. Patient stress change in care she felt fine, and was taking her medications as they were prescribed prior. I stressed to patient that I still do not have discharge summary from the rehab facility. As soon as I do I will contact her and we will go over medications together. Patient was agreeable to this. She does have [facility] care in the home. She has skilled nursing as well as physical and Occupational Therapy. Inpatient course: Discharge summary reviewed- see chart. Interval history/Current status: stable     Vitals:    08/09/21 0832   BP: 94/60   Site: Right Upper Arm   Position: Sitting   Cuff Size: Medium Adult   Pulse: 112   Resp: 18   Temp: 97.3 °F (36.3 °C)   TempSrc: Temporal   SpO2: 98%   Weight: 170 lb 12.8 oz (77.5 kg)     Body mass index is 30.26 kg/m². Wt Readings from Last 3 Encounters:   08/09/21 170 lb 12.8 oz (77.5 kg)   07/06/21 190 lb 3.2 oz (86.3 kg)   01/11/21 202 lb (91.6 kg)     BP Readings from Last 3 Encounters:   08/09/21 94/60   07/06/21 (!) 82/0   01/11/21 112/74       Review of Systems   Constitutional: Positive for fatigue. Negative for activity change, appetite change, chills and fever. HENT: Negative for congestion, ear pain, postnasal drip, rhinorrhea, sinus pressure, sneezing, sore throat and trouble swallowing. Respiratory: Positive for cough and shortness of breath (w/exertion ). Negative for chest tightness and wheezing. Cardiovascular: Positive for leg swelling (minimal ).  Negative for chest pain and palpitations. Left chest PPM/AICD  Follows with cardiology    Gastrointestinal: Positive for abdominal distention and constipation. Negative for abdominal pain, blood in stool, diarrhea and nausea. Endocrine:        Follows with Endocrine for thyroid    Genitourinary: Positive for urgency. Negative for difficulty urinating, dysuria, frequency and hematuria. No incontinent    Musculoskeletal: Positive for arthralgias, back pain and gait problem. Negative for joint swelling and myalgias. Skin: Positive for wound (open lesions to btuttocks/coccyx ). Negative for rash. Allergic/Immunologic: Positive for environmental allergies. Negative for food allergies. Neurological: Positive for weakness and light-headedness (on occasion). Negative for dizziness, syncope, numbness and headaches. Recent fall, no injury    Psychiatric/Behavioral: Positive for agitation, decreased concentration, dysphoric mood and sleep disturbance. Negative for self-injury and suicidal ideas. The patient is nervous/anxious. Physical Exam  Vitals and nursing note reviewed. Constitutional:       General: She is not in acute distress. Appearance: Normal appearance. She is well-developed, well-groomed and overweight. She is not ill-appearing or toxic-appearing. HENT:      Head: Normocephalic. Right Ear: Tympanic membrane, ear canal and external ear normal. No middle ear effusion. There is no impacted cerumen. Tympanic membrane is not erythematous, retracted or bulging. Left Ear: Tympanic membrane, ear canal and external ear normal.  No middle ear effusion. There is no impacted cerumen. Tympanic membrane is not erythematous, retracted or bulging. Nose: Nose normal. No mucosal edema, congestion or rhinorrhea. Right Turbinates: Not enlarged or swollen. Left Turbinates: Not enlarged or swollen. Mouth/Throat:      Lips: Pink.       Mouth: Mucous membranes are moist.      Pharynx: Oropharynx is clear. No oropharyngeal exudate, posterior oropharyngeal erythema or uvula swelling. Eyes:      General: Lids are normal. Vision grossly intact. No allergic shiner. Conjunctiva/sclera: Conjunctivae normal.   Cardiovascular:      Rate and Rhythm: Normal rate. Rhythm irregularly irregular. No extrasystoles are present. Pulses:           Radial pulses are 2+ on the right side and 2+ on the left side. Dorsalis pedis pulses are 1+ on the right side and 1+ on the left side. Heart sounds: Normal heart sounds, S1 normal and S2 normal. No murmur heard. Comments: Left upper chest wall PPM/AICD   Pulmonary:      Effort: Pulmonary effort is normal. No accessory muscle usage, prolonged expiration or respiratory distress. Breath sounds: Normal breath sounds and air entry. Abdominal:      General: There is no distension. Palpations: Abdomen is soft. Tenderness: There is no abdominal tenderness. Musculoskeletal:      Cervical back: Normal range of motion. No torticollis. No pain with movement. Right lower le+ Edema present. Left lower le+ Edema present. Comments: Use of walker    Lymphadenopathy:      Cervical: No cervical adenopathy. Skin:     General: Skin is warm and dry. Coloration: Skin is pale. Skin is not ashen, cyanotic or jaundiced. Findings: Abrasion and wound present. Neurological:      General: No focal deficit present. Mental Status: She is alert and oriented to person, place, and time. Motor: Weakness present. Gait: Gait abnormal.   Psychiatric:         Attention and Perception: Attention and perception normal.         Mood and Affect: Mood is anxious. Affect is flat. Speech: Speech normal.         Behavior: Behavior is agitated and aggressive. Behavior is cooperative. Thought Content: Thought content normal. Thought content does not include homicidal or suicidal ideation.  Thought content does not include homicidal or suicidal plan. Cognition and Memory: Cognition and memory normal.         Judgment: Judgment normal.         Assessment/Plan:  1. Large pleural effusion  -     [place] DISCHARGE MEDS RECONCILED W/ CURRENT OUTPATIENT MED LIST  2. Acute cystitis without hematuria  -     [place] DISCHARGE MEDS RECONCILED W/ CURRENT OUTPATIENT MED LIST  3. Cardiomyopathy, unspecified type ([facility] [address]  -     [place] DISCHARGE MEDS RECONCILED W/ CURRENT OUTPATIENT MED LIST  -     bumetanide (BUMEX) 2 MG tablet; Take 1 tablet by mouth daily, Disp-30 tablet, R-2Normal  4. Benign hypertension  -     losartan (COZAAR) 25 MG tablet; Take 1 tablet by mouth daily, Disp-30 tablet, R-2Normal  -     metoprolol succinate (TOPROL XL) 50 MG extended release tablet; Take 1 tablet by mouth daily, Disp-30 tablet, R-2Normal  -     spironolactone (ALDACTONE) 25 MG tablet; Take 1 tablet by mouth 2 times daily, Disp-60 tablet, R-2Normal  5. Chronic coronary artery disease  -     CBC Auto Differential; Future  -     Comprehensive Metabolic Panel, Fasting; Future  -     Lipid Panel; Future  6. Paroxysmal atrial fibrillation (HCC)  -     amiodarone (CORDARONE) 200 MG tablet; Take 1 tablet by mouth daily, Disp-30 tablet, R-2Normal  -     warfarin (COUMADIN) 2 MG tablet; Take 1 tablet by mouth daily Take per INR. May range from half tab to whole tab, Disp-30 tablet, R-2Normal  7. Cardiac defibrillator in place  8. Presence of cardiac pacemaker  9. Dyslipidemia  10. Gastroesophageal reflux disease, unspecified whether esophagitis present  -     omeprazole (PRILOSEC) 20 MG delayed release capsule; Take 1 capsule by mouth Daily, Disp-90 capsule, R-2Normal  11. Type 2 diabetes mellitus without complication, without long-term current use of insulin (HCC)  -     Ferritin; Future  -     Iron and TIBC; Future  -     Reticulocytes; Future  -     Vitamin B12 & Folate;  Future  -     Hemoglobin A1C; Future  -     Urinalysis Reflex to Culture; Future  12. Hypothyroidism, unspecified type  -     TSH with Reflex; Future  -     levothyroxine (SYNTHROID) 50 MCG tablet; Take 1 tablet by mouth Daily, Disp-30 tablet, R-2Normal  13. Current mild episode of major depressive disorder without prior episode (HCC)  -     traZODone (DESYREL) 150 MG tablet; Take 1 tablet by mouth nightly, Disp-45 tablet, R-2Normal  14. Anxiety  -     ALPRAZolam (XANAX) 0.25 MG tablet; Take 1 tablet by mouth 2 times daily as needed for Anxiety for up to 30 days. , Disp-30 tablet, R-0Normal  15. Other insomnia  -     traZODone (DESYREL) 150 MG tablet; Take 1 tablet by mouth nightly, Disp-45 tablet, R-2Normal  16. Malaise and fatigue  -     Ferritin; Future  -     Iron and TIBC; Future  -     Reticulocytes; Future  -     TSH with Reflex; Future  -     Vitamin D 25 Hydroxy; Future  -     Sedimentation Rate; Future  -     Multiple Vitamins-Minerals (THERAPEUTIC MULTIVITAMIN-MINERALS) tablet; Take 1 tablet by mouth daily, Disp-30 tablet, R-2Normal  17. Body mass index (BMI) 31.0-31.9, adult   -     Vitamin D 25 Hydroxy; Future        Medical Decision Making: high complexity    An electronic signature was used to authenticate this note. --BRISSA Cochran - CNP   Please note that this chart was generated using voice recognition Dragon dictation software. Although every effort was made to ensure the accuracy of this automated transcription, some errors in transcription may have occurred.

## 2021-08-15 PROBLEM — K21.9 GASTROESOPHAGEAL REFLUX DISEASE: Status: ACTIVE | Noted: 2021-01-01

## 2021-08-16 NOTE — TELEPHONE ENCOUNTER
Spoke with with patient and home care nurse after I reviewed discharge summary of rehab facility and [place]. There were changes. Patient states that she was not told to make changes at her appointment. Agreed to allow me to to review both discharge summaries and then make changes as appropriate. 1- Beta blocker changed from Coreg BID to Metoprolol daily   2- Lasix changed to Bumex  3- Avapro stopped   Cozaar daily and Aldactone twice a day started   4- Desyrel the same  5- Xanax sent per her request   6- Prilosec the same  7- Cordarone the same   8- Synthroid the same     I was worried that she would  for pharmacy and potentially take incorrectly. I stressed that to her her nurse in the home. Please call and verify above. She is supposed to bring me all the medications on Wednesday for her nurse visit for bp check.

## 2021-08-16 NOTE — TELEPHONE ENCOUNTER
Attempted to call, phone rang a fast busy signal. Attempted to call secondary number listed and they said I had the wrong number. I sent a mychart message and told her to call if she had any questions.

## 2021-08-16 NOTE — TELEPHONE ENCOUNTER
----- Message from Donny Huertas sent at 8/16/2021 10:38 AM EDT -----  Subject: Medication Problem    QUESTIONS  Name of Medication? metoprolol succinate (TOPROL XL) 50 MG extended   release tablet  Patient-reported dosage and instructions? 1 50 mg tablet - 1 time per day   What question or problem do you have with the medication? Patient is   requesting information on taking this medication to lower her blood   pressure. She doesn't feel her blood pressure needs to be lowered. Preferred Pharmacy? [facility] [address], [address] [place]  Pharmacy phone number (if available)? [phone]  Additional Information for Provider?   ---------------------------------------------------------------------------  --------------  [address]  What is the best way for the office to contact you? OK to leave message on   [place], OK to respond with electronic message via [employer] portal (only   for patients who have registered [employer] account)  Preferred Call Back Phone Number? [phone]  ---------------------------------------------------------------------------  --------------  SCRIPT ANSWERS  Relationship to Patient?  Self

## 2021-08-18 NOTE — PROGRESS NOTES
Patient in this morning for nurse visit for blood pressure check. [place] this [place] to have her lab work completed. Patient did bring all of her medications from home as there was questions regarding no changes with discharge. We  all her medications to what she is prescribed to take, versus what she has no longer to be taken. I printed for her a medication list moving forward and answered all of her questions. Due to her blood pressure remaining to be on the lower end, with a higher, irregular heart rhythm (afib), I explained rationale of the beta-blocker to help control the heart rate, which will in turn as an result help stabilize the blood pressure. We did cut the dose in half as the pills are scored. We are discontinuing moving forward the spironolactone as well as losartan. Those were both new medication she was discharged with, and never filled by the pharmacy. We are continuing to keep the Avapro discontinued. All of her questions were answered. Explanations given as to changes per documentation of the specialist notes while she was admitted in the hospital.    Patient is asking if she has to continue going to [facility] for her INR checks. We did discuss that they have immediate answers and can tell her changes and schedule next appointment. If she chooses to come to an outpatient lab closer to home, she will have to correlate that with [facility] vascular to have standing orders.

## 2021-08-18 NOTE — PROGRESS NOTES
Patient presents in the office today with spouse for medication check and BP check. Patient is alert, oriented, and dressed approprietly for the day. Denies any sx's of hypertension no chest pain, dizziness, or headaches noted.

## 2021-08-18 NOTE — TELEPHONE ENCOUNTER
----- Message from Rosio Thomas sent at 8/17/2021 10:36 AM EDT -----  Subject: Results Request    QUESTIONS  Which lab or imaging result is the patient calling about? urine sample  Which provider ordered the test? Eugune Dancer   At what location was the test performed? Date the test was performed? 2021-08-17  Additional Information for Provider? patient would like the results of the   urine sample that she brought in this morning.  ---------------------------------------------------------------------------  --------------  CALL BACK INFO  What is the best way for the office to contact you? OK to leave message on   voicemail  Preferred Call Back Phone Number?  [phone]

## 2021-08-20 NOTE — RESULT ENCOUNTER NOTE
Please call patient and let her know I reviewed her blood work. Electrolytes are normal.Kidney function is improving from her hospital stay. Iron studies are okay. Hemoglobin A1c is 6.4%. I do want her to continue with the Metformin as prescribed. Cholesterol level is okay. Her thyroid level has no significant changes from previous. No change needed in her levothyroxine. Her vitamin D level is sufficient. She is lower and vitamin B12 and would benefit from an injection. If she wants she can come in as a \"nurse visit\" on Monday during Marbin's appointment. Her overall blood count is okay. Her anti-inflammatory markers are normal.Her iron studies are also normal.Please ask how she is feeling? How have her blood pressure has been doing?

## 2021-08-20 NOTE — RESULT ENCOUNTER NOTE
Slight elevation in the creatinine measurement. All other function returned to normal. Electrolytes normal.   Unsure on how many injections. Lets start with one and go from there.

## 2021-08-31 NOTE — PROGRESS NOTES
Here today with spouse. [place] for BP check and vitamin B12 injection. Repeat labs 2 days prior to scheduled visit in 4-6 weeks. Xanax refilled today. Will discuss daily anxiety medications at that appointment to reduce Xanax use.

## 2021-08-31 NOTE — PROGRESS NOTES
Patient presents in the office today with self for B12 injection and BP check. Patient is alert, oriented, and dressed appropriately or the day. Tolerated well with no reactions.

## 2021-09-15 NOTE — TELEPHONE ENCOUNTER
----- Message from Eladia Melgoza sent at 9/13/2021  9:28 AM EDT -----  Subject: Message to Provider    QUESTIONS  Information for Provider? Patient has questions on her blood work coming   up for Oct appt. and does she fast. confused on what needs to be done cb   pt  ---------------------------------------------------------------------------  --------------  CALL BACK INFO  What is the best way for the office to contact you? OK to leave message on   voicemail  Preferred Call Back Phone Number? [phone]  ---------------------------------------------------------------------------  --------------  SCRIPT ANSWERS  Relationship to Patient?  Self

## 2021-09-20 NOTE — TELEPHONE ENCOUNTER
----- Message from Courtney Grover sent at 9/20/2021  8:34 AM EDT -----  Subject: Message to Provider    QUESTIONS  Information for Provider? Patient would like to stop in and get an urine   cup and hat because she thinks she has another UTI. Please call patient to   advise   ---------------------------------------------------------------------------  --------------  CALL BACK INFO  What is the best way for the office to contact you? OK to leave message on   voicemail  Preferred Call Back Phone Number? [phone]  ---------------------------------------------------------------------------  --------------  SCRIPT ANSWERS  Relationship to Patient?  Self

## 2021-09-21 NOTE — PROGRESS NOTES
Subjective:  Sadi Jalloh presents for   Chief Complaint   Patient presents with    Fatigue     Started the other day- which usually means a UTI for her. She also is presenting with hypotension. denies any buring, blood or cloudiness. She states this is how she felt with her last uti that put her in the hospital  Denies any fevers or abd pain or cva pain    At home he bp is about 120/80- and she states she checks I regularly. Denies feeling light headed or faint. Just very tired this started in the past few days. Fluid intake is good      Patient Active Problem List   Diagnosis    Multinodular goiter    Dyslipidemia    Osteopenia    Cardiomyopathy ([facility]    FH: CAD (coronary artery disease)    Family history of cerebrovascular accident    Insomnia    Cardiac defibrillator in place    Presence of cardiac pacemaker    Anxiety    Benign hypertension    Calculus of kidney    Chronic coronary artery disease    History of atrial fibrillation    Hypercholesterolemia    High potassium    Polypharmacy    Hemorrhoids    Pre-diabetes    Hypothyroidism    History of dysplasia of vulva    Vulvar intraepithelial neoplasia (MARII) grade 3    Current mild episode of major depressive disorder without prior episode (HCC)    Supraventricular tachycardia ([facility] [address] [facility]    Long term (current) use of aspirin    Long term (current) use of oral hypoglycemic drugs    Personal history of nicotine dependence    Type 2 diabetes mellitus without complications (HCC)    Paroxysmal atrial fibrillation (HCC)    Conduction disorder of the heart    Paroxysmal ventricular tachycardia (HCC)    Gastroesophageal reflux disease       · .     Objective:  Physical Exam   Vitals:   Vitals:    09/21/21 0800   BP: 96/76   Site: Left Upper Arm   Position: Sitting   Cuff Size: Medium Adult   Pulse: 131   Temp: 98.2 °F (36.8 °C)   SpO2: 97%   Weight: 165 lb (74.8 kg)     Wt Readings from Last 3 Encounters:   09/21/21 165 lb (74.8 kg) 08/09/21 170 lb 12.8 oz (77.5 kg)   07/06/21 190 lb 3.2 oz (86.3 kg)     Ht Readings from Last 3 Encounters:   06/26/20 5' 3\" (1.6 m)   12/05/19 5' 2\" (1.575 m)   08/16/19 5' 2.5\" (1.588 m)     Body mass index is 29.23 kg/m². Constitutional: She is oriented to person, place, and time. She appears well-developed and well-nourished and in no acute distress. Answers all my questions appropriately. Head: Normocephalic and atraumatic. Eyes:conjunctiva appear normal.    Nose: pink, non-edematous mucosa. No polyps. No septal deviation    Throat: no erythema, tonsillar hypertrophy or exudate. No ulcerations noted. Lips/Teeth/Gums all appear normal.    Neck: Normal range of motion. Neck supple. No tracheal deviation present. No abnormal lymphadenopathy. No JVD noted. Carotids are clear bilaterally. No thyroid masses noted. Heart: RRR . No S3, S4, or gallop noted. Chest:   Good breath sounds noted. Clear to auscultation bilaterally. No rales, wheezes, or rhonchi noted. No respiratory retractions noted. Wall has symmetrical movement with respirations. No cva tenderness    Abdomen: No distension noted.  + bowel sounds in all quadrants which are normoactive. No bruits noted. No masses could be palpated. No unusual pulsatile masses noted. To deep palpation, patient denied any significant pain. No rebound, guarding or rigidity noted to my exam.      UA:Recent Labs     09/21/21  0815   NITRITE neg   COLORU yellow   PHUR 6.5   CLARITYU clear   SPECGRAV 1.010   LEUKOCYTESUR trace   BILIRUBINUR neg   BLOODU trace   GLUCOSEU neg       Assessment:   Encounter Diagnoses   Name Primary?     Dysuria     Fatigue, unspecified type Yes    Hypotension, unspecified hypotension type          Plan:   Medications Discontinued During This Encounter   Medication Reason    bumetanide (BUMEX) 2 MG tablet     metoprolol succinate (TOPROL XL) 50 MG extended release tablet      THE ABOVE NOTED DISCONTINUED MEDS MAY ONLY BE FROM 'CLEANING UP' THE MED LIST AND WERE NOT ACTUALLY CANCELED;  SEE CHART FOR DETAILS! Orders Placed This Encounter   Medications    nitrofurantoin, macrocrystal-monohydrate, (MACROBID) 100 MG capsule     Sig: Take 1 capsule by mouth 2 times daily for 7 days     Dispense:  14 capsule     Refill:  0     Orders Placed This Encounter   Procedures    Culture, Urine     Order Specific Question:   Specify (ex-cath, midstream, cysto, etc)? Answer:   clean catch    POCT Urinalysis No Micro (Auto)     Return in about 2 weeks (around 10/5/2021). Patient Instructions   Check bp daily  Push fluids  Stay out of the heat    Follow up with provider      I rechecked her bp here and got the same number. meds are adjusted.

## 2021-09-27 NOTE — TELEPHONE ENCOUNTER
Last visit: 8/9/21hfu, 1/11/21ntp  Last Med refill: 8/31/21  Does patient have enough medication for 72 hours: Yes    Next Visit Date:  Future Appointments   Date Time Provider Aime Hogan   10/5/2021  9:00 AM Haley Velazco, APRN - [facility] [address] Maintenance   Topic Date Due    COVID-19 Vaccine (1) Never done    Shingles Vaccine (1 of 2) Never done    Annual Wellness Visit (AWV)  Never done    DTaP/Tdap/Td vaccine (2 - Tdap) 07/10/2019    Flu vaccine (1) 09/01/2021    Lipid screen  08/18/2022    TSH testing  08/18/2022    Potassium monitoring  08/18/2022    Creatinine monitoring  08/18/2022    Pneumococcal 65+ years Vaccine  Completed    Hepatitis C screen  Completed    DEXA (modify frequency per FRAX score)  Addressed    Hepatitis A vaccine  Aged Out    Hib vaccine  Aged Out    Meningococcal (ACWY) vaccine  Aged Out       Hemoglobin A1C (%)   Date Value   08/18/2021 6.4   01/14/2021 6.2   07/15/2020 6.3             ( goal A1C is < 7)   No results found for: LABMICR  LDL Calculated (mg/dL)   Date Value   08/18/2021 42   01/14/2021 56       (goal LDL is <100)   AST (U/L)   Date Value   08/18/2021 20     ALT (U/L)   Date Value   08/18/2021 30     BUN (mg/dL)   Date Value   08/18/2021 15     BP Readings from Last 3 Encounters:   09/21/21 96/76   08/31/21 104/64   08/18/21 102/64          (goal 120/80)    All Future Testing planned in [facility]  Lab Frequency Next Occurrence   TSH With Reflex Ft4 Once 09/29/2021   Comprehensive Metabolic Panel Once 71/56/7366   Vitamin B12 & Folate Once 09/29/2021               Patient Active Problem List:     Multinodular goiter     Dyslipidemia     Osteopenia     Cardiomyopathy ([address]     FH: CAD (coronary artery disease)     Family history of cerebrovascular accident     Insomnia     Cardiac defibrillator in place     Presence of cardiac pacemaker     Anxiety     Benign hypertension     Calculus of kidney     Chronic coronary artery disease History of atrial fibrillation     Hypercholesterolemia     High potassium     Polypharmacy     Hemorrhoids     Pre-diabetes     Hypothyroidism     History of dysplasia of vulva     Vulvar intraepithelial neoplasia (MARII) grade 3     Current mild episode of major depressive disorder without prior episode ([address]     Supraventricular tachycardia ([address]     Long term (current) use of aspirin     Long term (current) use of oral hypoglycemic drugs     Personal history of nicotine dependence     Type 2 diabetes mellitus without complications (HCC)     Paroxysmal atrial fibrillation (HCC)     Conduction disorder of the heart     Paroxysmal ventricular tachycardia (HCC)     Gastroesophageal reflux disease

## 2021-09-30 NOTE — TELEPHONE ENCOUNTER
----- Message from Melissa Hicks sent at 9/29/2021  9:22 AM EDT -----  Subject: Message to Provider    QUESTIONS  Information for Provider? Patient is calling to request medication for   GERD, states she just saw Bernard Trent regarding this issue and the   medication she prescribed has not helped. Please call patient back to   advise. Has an appt on 10/5, would like a script called in before that   appt if possible, [address] [facility] [phone]  ---------------------------------------------------------------------------  --------------  [address]  What is the best way for the office to contact you? OK to leave message on   voicemail  Preferred Call Back Phone Number? [phone]  ---------------------------------------------------------------------------  --------------  SCRIPT ANSWERS  Relationship to Patient?  Self

## 2021-10-04 NOTE — TELEPHONE ENCOUNTER
Writer called to notify patient of alt medication and she is currently admitted to [facility]. Scope is planned. To call office once she is discharged.

## 2021-10-05 NOTE — RESULT ENCOUNTER NOTE
Noted. Completed (addressed/treated) while in ED. Completed (addressed/treated) during hospital admission.

## 2021-10-11 NOTE — RESULT ENCOUNTER NOTE
Noted. Please call and follow up. Noted admitted to [facility] for potential GI bleed? Colonoscopy?

## 2021-11-22 DIAGNOSIS — R73.03 PRE-DIABETES: ICD-10-CM

## 2022-02-16 NOTE — TELEPHONE ENCOUNTER
Last visit: 6/26/2020  Last Med refill: 6/4/2020  Does patient have enough medication for 72 hours: Yes    Next Visit Date:  No future appointments.     Health Maintenance   Topic Date Due    Shingles Vaccine (1 of 2) 05/20/1994    Annual Wellness Visit (AWV)  05/29/2019    DTaP/Tdap/Td vaccine (2 - Tdap) 07/10/2019    Flu vaccine (1) 09/01/2020    Lipid screen  07/15/2021    TSH testing  07/15/2021    Potassium monitoring  07/15/2021    Creatinine monitoring  07/15/2021    Pneumococcal 65+ years Vaccine  Completed    DEXA (modify frequency per FRAX score)  Addressed    Hepatitis A vaccine  Aged Out    Hepatitis B vaccine  Aged Out    Hib vaccine  Aged Out    Meningococcal (ACWY) vaccine  Aged Out       Hemoglobin A1C (%)   Date Value   07/15/2020 6.3   01/15/2020 6.1   07/10/2019 6.2             ( goal A1C is < 7)   No results found for: LABMICR  LDL Calculated (mg/dL)   Date Value   07/15/2020 43   01/15/2020 48       (goal LDL is <100)   AST (U/L)   Date Value   07/15/2020 18     ALT (U/L)   Date Value   07/15/2020 19     BUN (mg/dL)   Date Value   07/15/2020 9     BP Readings from Last 3 Encounters:   06/26/20 120/75   12/05/19 131/75   08/16/19 118/74          (goal 120/80)    All Future Testing planned in [facility]  Lab Frequency Next Occurrence   SANTI DIGITAL SCREEN W OR WO CAD BILATERAL Once 12/22/2020               Patient Active Problem List:     Multinodular goiter     Dyslipidemia     Essential hypertension     Osteopenia     Cardiomyopathy ([facility] [address]     FH: CAD (coronary artery disease)     Family history of cerebrovascular accident     Insomnia     Depression     Cardiac defibrillator in place     Presence of cardiac pacemaker     Anxiety     Benign hypertension     Calculus of kidney     Chronic coronary artery disease     History of atrial fibrillation     Hypercholesterolemia     High potassium     Polypharmacy     Hemorrhoids     Pre-diabetes     Hypothyroidism     Morbidly obese ([address] History of dysplasia of vulva     Vulvar intraepithelial neoplasia (MARII) grade 3     Current mild episode of major depressive disorder without prior episode ([address]     Supraventricular tachycardia ([address] walker